# Patient Record
Sex: MALE | URBAN - METROPOLITAN AREA
[De-identification: names, ages, dates, MRNs, and addresses within clinical notes are randomized per-mention and may not be internally consistent; named-entity substitution may affect disease eponyms.]

---

## 2020-07-13 ENCOUNTER — OFFICE VISIT (OUTPATIENT)
Dept: ORTHOPEDIC SURGERY | Facility: CLINIC | Age: 35
End: 2020-07-13

## 2020-07-13 VITALS
DIASTOLIC BLOOD PRESSURE: 69 MMHG | SYSTOLIC BLOOD PRESSURE: 109 MMHG | BODY MASS INDEX: 32.3 KG/M2 | HEART RATE: 77 BPM | HEIGHT: 70 IN | TEMPERATURE: 97.6 F | WEIGHT: 225.6 LBS

## 2020-07-13 DIAGNOSIS — M41.9 SCOLIOSIS, UNSPECIFIED SCOLIOSIS TYPE, UNSPECIFIED SPINAL REGION: ICD-10-CM

## 2020-07-13 DIAGNOSIS — G89.29 CHRONIC RIGHT SHOULDER PAIN: Primary | ICD-10-CM

## 2020-07-13 DIAGNOSIS — M54.9 OTHER CHRONIC BACK PAIN: ICD-10-CM

## 2020-07-13 DIAGNOSIS — G89.29 OTHER CHRONIC BACK PAIN: ICD-10-CM

## 2020-07-13 DIAGNOSIS — M25.511 CHRONIC RIGHT SHOULDER PAIN: Primary | ICD-10-CM

## 2020-07-13 DIAGNOSIS — M79.644 PAIN OF FINGER OF RIGHT HAND: ICD-10-CM

## 2020-07-13 RX ORDER — BETAMETHASONE SODIUM PHOSPHATE AND BETAMETHASONE ACETATE 3; 3 MG/ML; MG/ML
6 INJECTION, SUSPENSION INTRA-ARTICULAR; INTRALESIONAL; INTRAMUSCULAR; SOFT TISSUE ONCE
Qty: 0.5 ML | Refills: 0
Start: 2020-07-13 | End: 2020-07-13

## 2020-07-13 RX ORDER — BETAMETHASONE SODIUM PHOSPHATE AND BETAMETHASONE ACETATE 3; 3 MG/ML; MG/ML
6 INJECTION, SUSPENSION INTRA-ARTICULAR; INTRALESIONAL; INTRAMUSCULAR; SOFT TISSUE ONCE
Qty: 0.5 ML | Refills: 0 | Status: CANCELLED
Start: 2020-07-13 | End: 2020-07-13

## 2020-07-13 RX ORDER — IBUPROFEN 800 MG/1
800 TABLET ORAL
COMMUNITY
End: 2022-01-03 | Stop reason: SDUPTHER

## 2020-07-13 NOTE — PROGRESS NOTES
Verbal order given by Dr. Bryan Cooper to draw up 4 mL 0.25% Sensorcaine and 0.5 mL 30 mg/5mL Betamethasone.

## 2020-07-13 NOTE — PROGRESS NOTES
Patient: Nahum Rodriguez                MRN: [id number]       SSN: [id number]  YOB: 1985        AGE: 30 y.o. SEX: male    PCP: No primary care provider on file.  07/13/20    Chief Complaint   Patient presents with    Shoulder Pain     Rt    Finger Pain    Back Pain     Lumbar & Thoracic     HISTORY:  Nahum Rodriguez is a 30 y.o. male who is seen for back, right shoulder, and right middle finger pain. He has been experiencing back pain for the past several years. He was told he had scoliosis in the past without treatment. He is also seen for right shoulder pain. He has been experiencing right shoulder pain and instability for the past several years. He initially dislocated his shoulder during high school wrestling practice in high school. He states he is able to pop his shoulder back into place himself. He feels shoulder pain with overhead activities and at night. He is also seen for right middle finger pain. He injured his finger when he lost control of a riding  and jammed his finger on the mower while making a sudden move. Pain Assessment  7/13/2020   Location of Pain Shoulder;Finger;Back   Location Modifiers Right   Severity of Pain 9   Quality of Pain Throbbing; Sharp;Dull;Aching;Locking;Grinding;Cracking   Duration of Pain Persistent   Frequency of Pain Constant   Aggravating Factors Bending   Limiting Behavior Yes   Relieving Factors Nothing   Relieving Factors Comment medication not helpful   Result of Injury Yes   Work-Related Injury Yes     Occupation, etc:  Mr. Rivka Serrano works at [employer]. He lives in [place] with his 12 yo son and his son's mother. 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 He had an athletic schlarship to wrestle and play football for [place] but was his life plan was derailed when was arrested and incarcerated. Mr. Rivka Serrano weighs 225 lbs and is 5'10\" tall.        No results found for: HBA1C, HGBE8, RHY2HVNU, NJX0LFNT, DNM4SQPS  Weight Metrics 7/13/2020   Weight 225 lb 9.6 oz   BMI 32.37 kg/m2       There is no problem list on file for this patient.     REVIEW OF SYSTEMS:    Constitutional Symptoms: Negative   Eyes: Negative   Ears, Nose, Throat and Mouth: Negative   Cardiovascular: Negative   Respiratory: Negative   Genitourinary: Per HPI   Gastrointestinal: Per HPI   Integumentary (Skin and/or Breast): Negative   Musculoskeletal: Per HPI   Endocrine/Rheumatologic: Negative   Neurological: Per HPI   Hematology/Lymphatic: Negative    Allergic/Immunologic: Negative   Phychiatric: Negative    Social History     Socioeconomic History    Marital status: SINGLE     Spouse name: Not on file    Number of children: Not on file    Years of education: Not on file    Highest education level: Not on file   Occupational History    Not on file   Social Needs    Financial resource strain: Not on file    Food insecurity     Worry: Not on file     Inability: Not on file    Transportation needs     Medical: Not on file     Non-medical: Not on file   Tobacco Use    Smoking status: Light Tobacco Smoker    Smokeless tobacco: Never Used    Tobacco comment: socially    Substance and Sexual Activity    Alcohol use: Yes     Comment: socially    Drug use: Yes     Types: Marijuana    Sexual activity: Not on file   Lifestyle    Physical activity     Days per week: Not on file     Minutes per session: Not on file    Stress: Not on file   Relationships    Social connections     Talks on phone: Not on file     Gets together: Not on file     Attends [employer]ism service: Not on file     Active member of club or organization: Not on file     Attends meetings of clubs or organizations: Not on file     Relationship status: Not on file    Intimate partner violence     Fear of current or ex partner: Not on file     Emotionally abused: Not on file     Physically abused: Not on file     Forced sexual activity: Not on file   Other Topics Concern    Not on file   Social History Narrative    Not on file      No Known Allergies   Current Outpatient Medications   Medication Sig    ibuprofen (MOTRIN) 800 mg tablet Take  by mouth. No current facility-administered medications for this visit.        PHYSICAL EXAMINATION:  Visit Vitals  /69   Pulse 77   Temp 97.6 °F (36.4 °C)   Ht 5' 10\" (1.778 m)   Wt 225 lb 9.6 oz (102.3 kg)   BMI 32.37 kg/m²      ORTHO EXAMINATION:  Examination Lumbar Thoracic   Skin Intact Intact   Tenderness + -   Tightness - -   Lordosis Normal N/A   Kyphosis N/A Normal   Scoliosis Right thoroco lumbar prominence with forward bending -   Flexion Fingertips to mid shin N/A   Extension 10 N/A   Knee reflexes Normal N/A   Ankle reflexes Normal N/A   Straight leg raise - N/A   Calf tenderness - N/A      Examination Right shoulder Left shoulder   Skin Intact Intact   Effusion - -   Biceps deformity - -   Atrophy - -   AC joint tenderness - -   Acromial tenderness + +   Biceps tenderness - -   Forward flexion/Elevation  170   Active abduction  170   External rotation ROM 40 50   Internal rotation ROM 85 90   Apprehension + -   Impingement - -   Drop Arm Test - -   Neurovascular Intact Intact     Examination Right Hand Left Hand   Skin Intact Intact   Deformity Slight nail deformity middle finger, healing subhematoma -   Swelling - -   Tenderness - -   Finger flexion Full Full   Finger extension Full Full   Sensation Normal Normal   Capillary refill Normal Normal   Heberden's nodes - -   Dupuytren's - -       TIME OUT:  Chart reviewed for the following:   Olga Markham MD, have reviewed the History, Physical and updated the Allergic reactions for Lucho ALLAN Dacia   TIME OUT performed immediately prior to start of procedure:  Olga Markham MD, have performed the following reviews on Lucho D [place] prior to the start of the procedure:          * Patient was identified by name and date of birth * Agreement on procedure being performed was verified  * Risks and Benefits explained to the patient  * Procedure site verified and marked as necessary  * Patient was positioned for comfort  * Consent was obtained     Time: 9:39 AM     Date of procedure: 7/13/2020  Procedure performed by:  Abdi Burgos MD  Mr. Rishi Villafuerte tolerated the procedure well with no complications. RADIOGRAPHS:  XR RIGHT SHOULDER 7/13/20 [facility]  IMPRESSION:  Three views - No fractures, no acromioclavicular narrowing, no glenohumeral narrowing, no calcific densities. XR RIGHT MIDDLE FINGER 7/13/20 [facility]  IMPRESSION:  Three views - healing distal phalanx fracture with mild volar angling fractures, no degenerative changes. XR THOR-LUMB 7/13/20 [facility]  IMPRESSION THOR:  Two views - No fractures, apex left scoliosis, + degenerative changes. IMPRESSION LUMB:  Two views - no fractures, no disc space narrowing, small osteophytes present, no spondylolisthesis. IMPRESSION:      ICD-10-CM ICD-9-CM    1. Chronic right shoulder pain  M25.511 719.41 AMB POC XRAY, SHOULDER; COMPLETE, 2+    G89.29 338.29 MRI SHOULDER RT W CONT      XR INJ SHOULDER RT PRE CT/MRI ARTHRO   2. Pain of finger of right hand  M79.644 729.5 AMB POC XRAY, FINGER(S), 2+ VIEWS   3. Other chronic back pain  M54.9 724.5 POC XRAY, SPINE; THOR-LUMB SP, 2 VIEW    G89.29 338.29 REFERRAL TO SPINE SURGERY      betamethasone (Celestone Soluspan) 6 mg/mL injection      BETAMETHASONE ACETATE & SODIUM PHOSPHATE INJECTION 3 MG EA. INJECT TRIGGER POINT, 1 OR 2   4. Scoliosis, unspecified scoliosis type, unspecified spinal region  M41.9 737.30 REFERRAL TO SPINE SURGERY      betamethasone (Celestone Soluspan) 6 mg/mL injection      BETAMETHASONE ACETATE & SODIUM PHOSPHATE INJECTION 3 MG EA. INJECT TRIGGER POINT, 1 OR 2     PLAN: Right shoulder MR arthrogram ordered.  After discussing treatment options, patient's paralumbar regions were injected with 4 cc Marcaine and 1/2 cc Celestone. Hewill follow up at the spine center & PR[facility] with Dr. Juani Cerna for orthopedic shoulder surgery consultation.       Scribed by Yordy Florez ([address]) as dictated by Esequiel Stephen MD

## 2020-08-07 ENCOUNTER — TELEPHONE (OUTPATIENT)
Dept: ORTHOPEDIC SURGERY | Facility: CLINIC | Age: 35
End: 2020-08-07

## 2020-08-07 DIAGNOSIS — M25.511 CHRONIC RIGHT SHOULDER PAIN: Primary | ICD-10-CM

## 2020-08-07 DIAGNOSIS — G89.29 CHRONIC RIGHT SHOULDER PAIN: Primary | ICD-10-CM

## 2020-08-07 NOTE — TELEPHONE ENCOUNTER
Radhika with [facility] Department called stating that the patient's MRI scheduled for 8/10/2020 was placed into a peer to peer. She stated that the peer to peer is only good until Monday 8/10/2020. The number to call is [phone] option 3 case number [id number].  Please advise Radhika at [phone]

## 2020-08-10 NOTE — TELEPHONE ENCOUNTER
Please note, due to no Physical therapy failed, MRI denied, patient must have documented PT (with failure) to improve in order for possible study authorization, no guarantee though even with PT failure, Patient must be seen back for post PT failure and documented.

## 2020-08-11 NOTE — TELEPHONE ENCOUNTER
Spoke with Trisha Irizarry in regards to where to go from the denied peer to peer. He said to place a order for physical therapy and then return to see Dr. Balta West after the physical therapy has been completed. I placed an order for PT and then I called the patient and explained everything to him. He stated he understood.

## 2020-08-17 ENCOUNTER — HOSPITAL ENCOUNTER (OUTPATIENT)
Dept: PHYSICAL THERAPY | Age: 35
Discharge: HOME | End: 2020-08-17
Payer: MEDICAID

## 2020-08-17 ENCOUNTER — TELEPHONE (OUTPATIENT)
Dept: ORTHOPEDIC SURGERY | Facility: CLINIC | Age: 35
End: 2020-08-17

## 2020-08-17 PROCEDURE — 97162 PT EVAL MOD COMPLEX 30 MIN: CPT

## 2020-08-17 NOTE — PROGRESS NOTES
PT DAILY TREATMENT NOTE    Patient Name: Nahum Rodriguez  Date:2020  : 1985  [x]  Patient  Verified  Payor: [employer] MEDICAID / Plan: 1 [facility] / Product Type: Managed Care Medicaid /    In time: 3:48 PM  Out time: 4:50 PM  Total Treatment Time (min): 62  Total Timed Codes (min): 7  One-on-One Treatment Time (min): 62  Visit #: 1 of 16    Treatment Area: Chronic right shoulder pain [M25.511, G89.29]    SUBJECTIVE  Pain Level (0-10 scale): 0,6/10 Location: R sh,LBP  Any medication changes, allergies to medications, adverse drug reactions, diagnosis change, or new procedure performed?: [x] No [] Yes (see summary sheet for update)  Subjective functional status/changes: See POC    OBJECTIVE    7 min Therapeutic Exercise: [x] See Flow Sheet   Rationale: increase ROM, increase strength, improve coordination, improve balance, and increase proprioception to improve the patients ability to progress to functional activities limited by pain or other dysfunction       Billed As:   [x] TE   [] TA   [] Neuro   [] Self Care min Patient Education: [x] Review HEP, diagnosis, prognosis, POC       Other Objective/Functional Measures:    Pain Level (0-10 scale) post treatment: 0,6/10 Location: R sh,LB    ASSESSMENT:    Justification for Eval Code Complexity:  Patient History: Tobacco use, chronicity of R sh and LBP  Examination: See exam  Clinical Presentation: evolving  Clinical Decision Making: MEDIUM  FOTO: R sh 79 LB 35 /100    Patient will continue to benefit from skilled PT services to modify and progress therapeutic interventions, address functional mobility deficits, address ROM deficits, address strength deficits, analyze and address soft tissue restrictions, analyze and cue movement patterns, analyze and modify body mechanics/ergonomics, assess and modify postural abnormalities, address imbalance/dizziness, and instruct in home and community integration to attain remaining goals.      [x]  See Plan of Care  []  See Progress Note/Re-certification  []  See Discharge Summary      Progress towards goals / Updated goals: See POC    PLAN  [x] Upgrade activities as tolerated   [x] Continue plan of care  [] Update interventions per flow sheet  [] Discharge due to:   [] Other:     Joel Paez, PT 8/17/2020 4:10 PM    Future Appointments   Date Time Provider Neida Claire   8/19/2020  2:45 PM Kermit Lugo, PT [facility]   8/25/2020  3:30 PM Everrett Desanctis, PTA [facility]   8/31/2020 12:45 PM Everrett Desanctis, PTA [facility]   9/2/2020  8:45 AM Everrett Desanctis, PTA [facility]   9/9/2020  9:00 AM Everrett Desanctis, PTA [facility]   9/11/2020  8:15 AM Everrett Desanctis, PTA [facility]   9/14/2020  8:15 AM Amanda Zavala, PT [facility]   9/16/2020  9:00 AM Everrett Desanctis, PTA [facility]   9/21/2020  8:15 AM Amanda Zavala, PT [facility]   9/23/2020  9:00 AM Everrett Desanctis, PTA [facility]   9/28/2020  8:15 AM Amanda Zavala, PT [facility]   9/30/2020  9:00 AM Everrett Desanctis, PTA [facility]

## 2020-08-17 NOTE — PROGRESS NOTES
[address] [place] [address] PHYSICAL THERAPY AT [facility] [address], [address], [address] - Phone: [phone]  Fax: [phone] / [address] [place]  Patient Name: Rebecca Cardenas : 1985   Medical   Diagnosis: Low back pain [M54.5]  Pain in right shoulder [M25.511] Treatment Diagnosis: Low back pain [M54.5]  Pain in right shoulder [M25.511]   Onset Date: LBP (), R Sh ()     Referral Source: Medina Angulo MD [facility]): 2020   Prior Hospitalization: See medical history Provider #: [id number]   Prior Level of Function: WNL without limitations, chronic R sh sublux approx 1x/mo per pt report   Comorbidities: Tobacco use, chronicity of R sh and LBP   Medications: Verified on Patient Summary List   The Plan of Care and following information is based on the information from the initial evaluation.    ==================================================================================  Assessment / key information: Patient is 30 y.o. y o male who presents to [address] [place] [address] PT at [facility] with diagnosis of Low back pain [M54.5]  Pain in right shoulder [M25.511]. 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 \"   Pt has recurrent R shoulder sublux and reduction about 1x/month without MD reduction. Patient scored 28 LB and 68 R Sh on FOTO indicating decreased function and quality of life. Functional limitations include difficulty with R Sh(reaching up, reaching behind); LB (standing >2 hours, walking, sitting, sleeping on stomach or side).   Pt would benefit from skilled PT services to address impairments, work towards goals, and return to PLOF.    R SHOULDER  Pain: current 0/10, at worst 7/10, at best 0/10  Aggravating factors: reaching up, reaching behind  Alleviating factors: popping it back in place  Pain description: surprising  Pain location: anterior R shoulder    R GHJ AROM(PROM): flex 156, ext WNL, , ER WNL, IR WNL - discomfort with functional IR  R GHJ MMT: flex 3+, ext 5, ABD 3+, ER 4-, IR 4-  R Elbow MMT: flex WNL, ext 4-  Special tests: Apprehension test + R, - L  Palpation: TTP R pec    LOW BACK  Pain: current 6/10, at worst 10/10, at best 4/10  Aggravating factors: standing >2 hours, walking, sitting, sleeping on stomach or side  Alleviating factors: ice, Ibuprofen 800  Pain description: like a pole getting moved around  Pain location: mid to low back  Radiation? Numbness/tingling?  None reported    Lumbar AROM/pain: flex bilat malleoli, ext negligible, SB R lat R knee L mid lat thigh pain, rot R WFL L WFL - pain t/o all active movement  Hip MMT: flex R 3 L 3, ext R 3- L 3-, ABD R 3 L 3, IR R 3+ pain L 4+, ER R 3 pain L 4+  Knee MMT: flex R 4- L 4, ext R 5 L 5  Ankle MMT: grossly WNL bilat  Palpation: TTP R lumbar paraspinals, bilat PSIS, superior R post hip musculature  ==================================================================================  Eval Complexity: History: MEDIUM  Complexity : 1-2 comorbidities / personal factors will impact the outcome/ POC Exam:MEDIUM Complexity : 3 Standardized tests and measures addressing body structure, function, activity limitation and / or participation in recreation  Presentation: MEDIUM Complexity : Evolving with changing characteristics  Clinical Decision Making:MEDIUM Complexity : FOTO score of 26-74Overall Complexity:MEDIUM  Problem List: pain affecting function, decrease ROM, decrease strength, edema affecting function, impaired gait/ balance, decrease ADL/ functional abilitiies, decrease activity tolerance, decrease flexibility/ joint mobility and decrease transfer abilities   Treatment Plan may include any combination of the following: Therapeutic exercise, Therapeutic activities, Neuromuscular re-education, Physical agent/modality, Gait/balance training, Manual therapy, Patient education, Self Care training, Functional mobility training and Stair training  Patient / Family readiness to learn indicated by: asking questions, trying to perform skills and interest  Persons(s) to be included in education: patient (P)  Barriers to Learning/Limitations: yes;  other Transportation  Measures taken:    Patient Goal (s): \"To get rid of the pain\"   Patient self reported health status: good  Rehabilitation Potential: good   Short Term Goals: To be accomplished in  4  weeks:  1) Pt performing HEP. 2) Pt will report 50% improvement in ability to perform UE functional tasks, ADL, aggravating movements. 3) Pt to demo improved R GHJ ROM all planes to within normal limits to facilitate UE functional tasks, ADL. 4) Patient to demo lumbar AROM all planes WNL to facilitate functional tasks, ADL, work duties.  Long Term Goals: To be accomplished in  8  weeks:  1) Patient Independent with progressive HEP to facilitate symptom management and progression upon DC. 2) Pt to increase score on FOTO to R Sh 78, LB 53  indicating improved function and quality of life. 3) Pt to demo R GHJ MMT >=4+ in order to have improved functional mobility. 4) Patient to demo BLE, low abdom MMT >=4+ to facilitate functional tasks, ADL, work duties. Frequency / Duration:   Patient to be seen  2-3  times per week for 8  weeks:  Patient / Caregiver education and instruction: exercises and other PT diagnosis, prognosis, POC    Therapist Signature: Olga Cunningham, PT Date: 8/08/4756   Certification Period: NA Time: 4:08 PM   ==================================================================================  I certify that the above Physical Therapy Services are being furnished while the patient is under my care. I agree with the treatment plan and certify that this therapy is necessary.     To ensure your patient receives the highest quality care and to avoid disruption in therapy please sign and return this plan of care within 21 days. Per Medicaid guidelines if the plan of care is not received within 21 days the patient's care must be put on hold until signed. Physician Signature:        Date:       Time:     Please sign and return to [facility] or you may fax the signed copy to [phone]. Thank you.

## 2020-08-17 NOTE — TELEPHONE ENCOUNTER
Pedro Man from [facility] called asking if the patient's referral for physical therapy can include his back as well per patient's request. Please advise Pedro Man at [phone]

## 2020-08-17 NOTE — TELEPHONE ENCOUNTER
OK per Dr Benitez Petties to add lumbar to current therapy treatment--spoke with [facility]--faxed to [phone]

## 2020-08-19 ENCOUNTER — HOSPITAL ENCOUNTER (OUTPATIENT)
Dept: PHYSICAL THERAPY | Age: 35
Discharge: HOME | End: 2020-08-19
Payer: MEDICAID

## 2020-08-19 PROCEDURE — 97110 THERAPEUTIC EXERCISES: CPT

## 2020-08-19 PROCEDURE — 97112 NEUROMUSCULAR REEDUCATION: CPT

## 2020-08-19 PROCEDURE — 97014 ELECTRIC STIMULATION THERAPY: CPT

## 2020-08-19 PROCEDURE — 97530 THERAPEUTIC ACTIVITIES: CPT

## 2020-08-19 NOTE — PROGRESS NOTES
PT DAILY TREATMENT NOTE     Patient Name: Sienna Donohue  Date:2020  : 1985  [x]  Patient  Verified  Payor: Nito Alexandre / Plan: [address] [place] [id number] / Product Type: Managed Care Medicaid /    In time:3:08  Out time:4:10  Total Treatment Time (min): 62  Visit #: 2 of 16    Treatment Area: Low back pain [M54.5]  Pain in right shoulder [M25.511]    SUBJECTIVE  Pain Level (0-10 scale): 7 low back, 0 shoulder  Any medication changes, allergies to medications, adverse drug reactions, diagnosis change, or new procedure performed?: [x] No    [] Yes (see summary sheet for update)  Subjective functional status/changes:   [] No changes reported  Patient reports pain from the midback to the lower back which is constant.     OBJECTIVE  Modality rationale: decrease pain and increase tissue extensibility to improve the patients ability to sleep   Min Type Additional Details   10 [] Estim: []Att   [x]Unatt  []TENS instruct                 [x]IFC  []Premod []NMES                       []Other:  []w/US   [x]w/ice   []w/heat  Position: supine  Location: thoracolumbar    []  Traction: [] Cervical       []Lumbar                       [] Prone          []Supine                       []Intermittent   []Continuous Lbs:  [] before manual  [] after manual    []  Ultrasound: []Continuous   [] Pulsed                           []1MHz   []3MHz Location:  W/cm2:    []  Iontophoresis with dexamethasone         Location: [] Take home patch   [] In clinic    []  Ice     []  heat  []  Ice massage Position:  Location:    []  Vasopneumatic Device Pressure: [] lo [] med [] hi   Temp: [] lo [] med [] hi   [] Skin assessment post-treatment:  []intact []redness- no adverse reaction       []redness  adverse reaction:       10 min Therapeutic Exercise:  Stretches   Rationale: increase ROM, increase strength, improve coordination, improve balance, and increase proprioception to improve the patients ability to progress to functional activities limited by pain or other dysfunction     15 min Therapeutic Activity:  UBE, pronelying with education on positioning to abolish or decrease pain. Rationale: to improve functional activities, model real life movements and performance specific to the patients need with supervision to return the patient to their PLOF      22 min Neuromuscular Re-education:  Shoulder isometrics, TA, BKFO   Rationale: exercises designed to improve and/or maintain balance, coordination, kinesthetic sense, posture, and/or proprioception for functional activities to improve the patients ability to function in daily life    na min Gait Training:    Rationale: facilitate gait in all varieties including dynamic movements to improved gait quality in the home and community    5 min Manual Therapy:  NC per insurance restrictions No tenderness to palpation over thoraco lumbar paraspinals or spinous processes. Rationale: increase postural awareness to na          X throughout treatment min Patient Education: [x] Review HEP. Shoulder isometrics, TA, BKFO. Pronelying at least 1x day. Effects of IFC. He thinks he may have a TENS unit. Encouraged to bring the unit in. Other Objective/Functional Measures: Pain decreased to 2/10 with pronelying. SKTC and prone to elbows increased pain. Pain Level (0-10 scale) post treatment: LB 5, shoulder 0  SSESSMENT/Changes in Function: Patient continues to report constant pain. Appears to be neutral to extension biased. Began core strengthening for shoulder and back.     Patient will continue to benefit from skilled PT services to modify and progress therapeutic interventions, address functional mobility deficits, address ROM deficits, address strength deficits, analyze and address soft tissue restrictions, analyze and cue movement patterns, analyze and modify body mechanics/ergonomics, assess and modify postural abnormalities, address imbalance/dizziness, and instruct in home and community integration to attain remaining goals. []  See Plan of Care  []  See progress note/recertification  []  See Discharge Summary         Progress towards goals / Updated goals:  1) Pt performing HEP. 2) Pt will report 50% improvement in ability to perform UE functional tasks, ADL, aggravating movements. 3) Pt to demo improved R GHJ ROM all planes to within normal limits to facilitate UE functional tasks, ADL. 4) Patient to demo lumbar AROM all planes WNL to facilitate functional tasks, ADL, work duties.     PLAN  [x]  Upgrade activities as tolerated     []  Continue plan of care  []  Update interventions per flow sheet       []  Discharge due to:_  []  Other:_      Hetal Badillo PT 8/19/2020  4:10 PM    Future Appointments   Date Time Provider Neida Claire   8/25/2020  3:30 PM Leafy Dionna, [facility]   8/31/2020 12:45 PM Leafy Dionna, PTA [facility]   9/2/2020  8:45 AM Leafy Dionna, PTA [facility]   9/9/2020  9:00 AM Leafy Dionna, PTA [facility]   9/11/2020  8:15 AM Leafy Dionna, PTA [facility]   9/14/2020  8:15 AM Michelle Cameron, PT [facility]   9/16/2020  9:00 AM Leafy Dionna, PTA [facility]   9/21/2020  8:15 AM Michelle Cameron, PT [facility]   9/23/2020  9:00 AM Leafy Dionna, PTA [facility]   9/28/2020  8:15 AM Michelle Cameron, PT [facility]   9/30/2020  9:00 AM [facility] Dion[facility]

## 2020-08-25 ENCOUNTER — HOSPITAL ENCOUNTER (OUTPATIENT)
Dept: PHYSICAL THERAPY | Age: 35
Discharge: HOME | End: 2020-08-25
Payer: MEDICAID

## 2020-08-25 PROCEDURE — 97014 ELECTRIC STIMULATION THERAPY: CPT

## 2020-08-25 PROCEDURE — 97110 THERAPEUTIC EXERCISES: CPT

## 2020-08-25 PROCEDURE — 97530 THERAPEUTIC ACTIVITIES: CPT

## 2020-08-25 PROCEDURE — 97112 NEUROMUSCULAR REEDUCATION: CPT

## 2020-08-25 NOTE — PROGRESS NOTES
PT DAILY TREATMENT NOTE     Patient Name: Ranee Gowers  Date:2020  : 1985  [x]  Patient  Verified  Payor: Alissa Onofre / Plan: [address] [place] [id number] / Product Type: Managed Care Medicaid /    In time:3:45 pm Out time: 4:42 pm  Total Treatment Time (min): 62  Visit #: 3 of 16    Treatment Area: Low back pain [M54.5]  Pain in right shoulder [M25.511]    SUBJECTIVE  Pain Level (0-10 scale): 7 low back, 0 shoulder  Any medication changes, allergies to medications, adverse drug reactions, diagnosis change, or new procedure performed?: [x] No    [] Yes (see summary sheet for update)  Subjective functional status/changes:   [] No changes reported  Patient reports pain in low back is constant. Doing HEP . TA draw making back hurt.      OBJECTIVE  Modality rationale: decrease pain and increase tissue extensibility to improve the patients ability to sleep   Min Type Additional Details   10 [] Estim: []Att   [x]Unatt  []TENS instruct                 [x]IFC  []Premod []NMES                       []Other:  []w/US   [x]w/ice   []w/heat  Position: supine  Location: thoracolumbar    []  Traction: [] Cervical       []Lumbar                       [] Prone          []Supine                       []Intermittent   []Continuous Lbs:  [] before manual  [] after manual    []  Ultrasound: []Continuous   [] Pulsed                           []1MHz   []3MHz Location:  W/cm2:    []  Iontophoresis with dexamethasone         Location: [] Take home patch   [] In clinic    []  Ice     []  heat  []  Ice massage Position:  Location:    []  Vasopneumatic Device Pressure: [] lo [] med [] hi   Temp: [] lo [] med [] hi   [x] Skin assessment post-treatment:  [x]intact []redness- no adverse reaction       []redness  adverse reaction:       10 min Therapeutic Exercise:  Stretches   Rationale: increase ROM, increase strength, improve coordination, improve balance, and increase proprioception to improve the patients ability to progress to functional activities limited by pain or other dysfunction     18 min Therapeutic Activity:  UBE, pronelying with education on positioning to abolish or decrease pain. Rationale: to improve functional activities, model real life movements and performance specific to the patients need with supervision to return the patient to their PLOF      22 min Neuromuscular Re-education:  Shoulder isometrics, TA, BKFO   Rationale: exercises designed to improve and/or maintain balance, coordination, kinesthetic sense, posture, and/or proprioception for functional activities to improve the patients ability to function in daily life    na min Gait Training:    Rationale: facilitate gait in all varieties including dynamic movements to improved gait quality in the home and community    7 min Manual Therapy:  NC per insurance restrictions No tenderness to palpation over thoraco lumbar paraspinals or spinous processes. Rationale: increase postural awareness to na          X throughout treatment min Patient Education: [x] Review HEP. Shoulder isometrics, TA, BKFO. Pronelying at least 1x day. Effects of IFC. He thinks he may have a TENS unit. Encouraged to bring the unit in. Other Objective/Functional Measures: Pain decreased to 2/10 with pronelying. SKTC and prone to elbows increased pain. Pain Level (0-10 scale) post treatment: LB 5, shoulder 0  SSESSMENT/Changes in Function: Patient continues to report constant pain. Appears to be neutral to extension biased. Began core strengthening for shoulder and back.     Patient will continue to benefit from skilled PT services to modify and progress therapeutic interventions, address functional mobility deficits, address ROM deficits, address strength deficits, analyze and address soft tissue restrictions, analyze and cue movement patterns, analyze and modify body mechanics/ergonomics, assess and modify postural abnormalities, address imbalance/dizziness, and instruct in home and community integration to attain remaining goals. []  See Plan of Care  []  See progress note/recertification  []  See Discharge Summary         Progress towards goals / Updated goals:  1) Pt performing HEP. 2) Pt will report 50% improvement in ability to perform UE functional tasks, ADL, aggravating movements. 3) Pt to demo improved R GHJ ROM all planes to within normal limits to facilitate UE functional tasks, ADL. 4) Patient to demo lumbar AROM all planes WNL to facilitate functional tasks, ADL, work duties.     PLAN  [x]  Upgrade activities as tolerated     []  Continue plan of care  []  Update interventions per flow sheet       []  Discharge due to:_  []  Other:_      Erna Frankel PTA 8/25/2020  4:42 PM    Future Appointments   Date Time Provider Neida Claire   8/31/2020 12:45 PM Ronal Harris PTA [facility]   9/2/2020  8:45 AM Ronal Harris PTA [facility]   9/9/2020  9:00 AM Ronal Harris PTA [facility]   9/11/2020  8:15 AM Ronal Harris PTA [facility]   9/14/2020  8:15 AM Pernell Evangelista, PT [facility]   9/16/2020  9:00 AM Ronal Harris PTA [facility]   9/21/2020  8:15 AM Pernell Evangelista, PT [facility]   9/23/2020  9:00 AM Ronal Harris PTA [facility]   9/28/2020  8:15 AM Pernell Evangelista, PT [facility]   9/30/2020  9:00 AM Ronal Harris, [facility]

## 2020-09-02 ENCOUNTER — HOSPITAL ENCOUNTER (OUTPATIENT)
Dept: PHYSICAL THERAPY | Age: 35
Discharge: HOME | End: 2020-09-02
Payer: MEDICAID

## 2020-09-02 PROCEDURE — 97014 ELECTRIC STIMULATION THERAPY: CPT

## 2020-09-02 PROCEDURE — 97535 SELF CARE MNGMENT TRAINING: CPT

## 2020-09-02 PROCEDURE — 97530 THERAPEUTIC ACTIVITIES: CPT

## 2020-09-02 NOTE — PROGRESS NOTES
PT DAILY TREATMENT NOTE     Patient Name: Laura Rowland  Date:2020  : 1985  [x]  Patient  Verified  Payor: Veronica Nelli / Plan: [address] [place] [id number] / Product Type: Managed Care Medicaid /    In time:8:58 am Out time: 9:45   Total Treatment Time (min): 47  Visit #: 4 of 16    Treatment Area: Low back pain [M54.5]  Pain in right shoulder [M25.511]    SUBJECTIVE  Pain Level (0-10 scale): 6/10  low back, 0 shoulder  Any medication changes, allergies to medications, adverse drug reactions, diagnosis change, or new procedure performed?: [x] No    [] Yes (see summary sheet for update)  Subjective functional status/changes:   [] No changes reported  Patient reports pain in mid back over the weekend. He was laying left side on the bed playing cars with son and felt clicking in back. Used ice. He woke  5 pm he had to be straight. Difficulty leaning back  Into extension. Monday he woke up and  felt  pain with any direction with movement. He reports pain is slightly better today.       OBJECTIVE  Modality rationale: decrease pain and increase tissue extensibility to improve the patients ability to sleep   Min Type Additional Details   10 [] Estim: []Att   [x]Unatt  []TENS instruct                 [x]IFC  []Premod []NMES                       []Other:  []w/US   [x]w/ice   []w/heat  Position: supine  Location: thoracolumbar junction    []  Traction: [] Cervical       []Lumbar                       [] Prone          []Supine                       []Intermittent   []Continuous Lbs:  [] before manual  [] after manual    []  Ultrasound: []Continuous   [] Pulsed                           []1MHz   []3MHz Location:  W/cm2:    []  Iontophoresis with dexamethasone         Location: [] Take home patch   [] In clinic    []  Ice     []  heat  []  Ice massage Position:  Location:    []  Vasopneumatic Device Pressure: [] lo [] med [] hi   Temp: [] lo [] med [] hi   [x] Skin assessment post-treatment:  [x]intact []redness- no adverse reaction       []redness  adverse reaction:           11 min Therapeutic Activity:  UBE, sleeping positioning with towel and pillow   Rationale: to improve functional activities, model real life movements and performance specific to the patients need with supervision to return the patient to their PLOF      7 min Neuromuscular Re-education:  Shoulder isometrics, TA, BKFO   Rationale: exercises designed to improve and/or maintain balance, coordination, kinesthetic sense, posture, and/or proprioception for functional activities to improve the patients ability to function in daily life    na min Gait Training:    Rationale: facilitate gait in all varieties including dynamic movements to improved gait quality in the home and community    7 min Manual Therapy:  NC per insurance restrictions  Processes. Prone  STM to left > right Q/L, lower   Rationale: increase postural awareness to na          12 min Patient Education: [x] Review HEP. Postioning, activity modifcation for pain management         Other Objective/Functional Measures: Review body mechanics for sleeping and sitting, reaching body mechanics. Trunk AROM FF: ~50%, ext: 20%    Pain Level (0-10 scale) post treatment:    ASSESSMENT/Changes in Function:   Patient presenting with increase mm tone and muscle tenderness in left Q-L and glut medius, upper lumbar paraspinals > Right. Exercises held per flow sheet secondary to muscle spasm/pain in left low and mid back. Review positioning , use of ice/MH and HEP as tolerated for pain management. Recommended pt to consult MD if sx persist or increase. Pt reporting slight pain relief. Noted slight decreased ms tone after session.      Patient will continue to benefit from skilled PT services to modify and progress therapeutic interventions, address functional mobility deficits, address ROM deficits, address strength deficits, analyze and address soft tissue restrictions, analyze and cue movement patterns, analyze and modify body mechanics/ergonomics, assess and modify postural abnormalities, address imbalance/dizziness, and instruct in home and community integration to attain remaining goals. [x]  See Plan of Care  []  See progress note/recertification  []  See Discharge Summary         Progress towards goals / Updated goals:  1) Pt performing HEP. - goal in progress  2) Pt will report 50% improvement in ability to perform UE functional tasks, ADL, aggravating movements. 3) Pt to demo improved R GHJ ROM all planes to within normal limits to facilitate UE functional tasks, ADL. 4) Patient to demo lumbar AROM all planes WNL to facilitate functional tasks, ADL, work duties.     PLAN  [x]  Upgrade activities as tolerated     []  Continue plan of care  []  Update interventions per flow sheet       []  Discharge due to:_  []  Other:_      Martha Terrell PTA 9/2/2020  9:45 am    Future Appointments   Date Time Provider Neida Claire   9/9/2020  9:00 AM Martin Adas, PTA [facility]   9/11/2020  8:15 AM Martin Adas, PTA [facility]   9/14/2020  8:15 AM Aviles Boards, PT [facility]   9/16/2020  9:00 AM Martin Adas, PTA [facility]   9/21/2020  8:15 AM Aviles Boards, PT [facility]   9/23/2020  9:00 AM Martin Adas, PTA [facility]   9/28/2020  8:15 AM Aviles Boards, PT [facility]   9/30/2020  9:00 AM Martin Adas, PTA [facility]

## 2020-09-09 ENCOUNTER — HOSPITAL ENCOUNTER (OUTPATIENT)
Dept: PHYSICAL THERAPY | Age: 35
Discharge: HOME | End: 2020-09-09
Payer: MEDICAID

## 2020-09-09 PROCEDURE — 97112 NEUROMUSCULAR REEDUCATION: CPT

## 2020-09-09 PROCEDURE — 97530 THERAPEUTIC ACTIVITIES: CPT

## 2020-09-09 PROCEDURE — 97014 ELECTRIC STIMULATION THERAPY: CPT

## 2020-09-09 NOTE — PROGRESS NOTES
PT DAILY TREATMENT NOTE     Patient Name: Emily Sullivan  Date:2020  : 1985  [x]  Patient  Verified  Payor: Kathy Abo / Plan: 1 [facility] / Product Type: Managed Care Medicaid /    In time:9:05 am Out time: 9:54  Total Treatment Time (min): 49  Visit #:5 of 16    Treatment Area: Low back pain [M54.5]  Pain in right shoulder [M25.511]    SUBJECTIVE  Pain Level (0-10 scale): 6/10  low back, 0 shoulder  Any medication changes, allergies to medications, adverse drug reactions, diagnosis change, or new procedure performed?: [x] No    [] Yes (see summary sheet for update)  Subjective functional status/changes:   [] No changes reported  Patient reports no change in his back pain. \"I think the pain is coming from my scoliosis. It looked big when my doctor showed me the xray. \"  Shoulder has been doing OK.      OBJECTIVE  Modality rationale: decrease pain and increase tissue extensibility to improve the patients ability to sleep   Min Type Additional Details   10 [] Estim: []Att   [x]Unatt  []TENS instruct                 [x]IFC  []Premod []NMES                       []Other:  []w/US   [x]w/ice   []w/heat  Position: supine  Location: thoracolumbar junction    []  Traction: [] Cervical       []Lumbar                       [] Prone          []Supine                       []Intermittent   []Continuous Lbs:  [] before manual  [] after manual    []  Ultrasound: []Continuous   [] Pulsed                           []1MHz   []3MHz Location:  W/cm2:    []  Iontophoresis with dexamethasone         Location: [] Take home patch   [] In clinic    []  Ice     []  heat  []  Ice massage Position:  Location:    []  Vasopneumatic Device Pressure: [] lo [] med [] hi   Temp: [] lo [] med [] hi   [x] Skin assessment post-treatment:  [x]intact []redness- no adverse reaction       []redness  adverse reaction:           15 min Therapeutic Activity:  Tennis ball massage to left lumbar paraspinals. Rationale: to improve functional activities, model real life movements and performance specific to the patients need with supervision to return the patient to their PLOF      19 min Neuromuscular Re-education:  TA, Head press, shoulder press, leg press   Rationale: exercises designed to improve and/or maintain balance, coordination, kinesthetic sense, posture, and/or proprioception for functional activities to improve the patients ability to function in daily life    na min Gait Training:    Rationale: facilitate gait in all varieties including dynamic movements to improved gait quality in the home and community    5 min Manual Therapy:  NC per insurance restrictions  Processes. Prone  DTM left thoracolumbar paraspinals   Rationale: increase postural awareness to na           min Patient Education: [x] Review HEP. Add head, sh, leg press     Other Objective/Functional Measures: Added head, shoulder and leg press for strengthening of paraspinals. Pain Level (0-10 scale) post treatment:    ASSESSMENT/Changes in Function: Palpable muscle spasm left L/S paraspinals. Patient will continue to benefit from skilled PT services to modify and progress therapeutic interventions, address functional mobility deficits, address ROM deficits, address strength deficits, analyze and address soft tissue restrictions, analyze and cue movement patterns, analyze and modify body mechanics/ergonomics, assess and modify postural abnormalities, address imbalance/dizziness, and instruct in home and community integration to attain remaining goals. []  See Plan of Care  []  See progress note/recertification  []  See Discharge Summary         Progress towards goals / Updated goals:  1) Pt performing HEP. - goal in progress  2) Pt will report 50% improvement in ability to perform UE functional tasks, ADL, aggravating movements.   3) Pt to demo improved R GHJ ROM all planes to within normal limits to facilitate UE functional tasks, ADL. 4) Patient to demo lumbar AROM all planes WNL to facilitate functional tasks, ADL, work duties.     PLAN  [x]  Upgrade activities as tolerated     []  Continue plan of care  []  Update interventions per flow sheet       []  Discharge due to:_  []  Other:_      Cordelia Yang, PT 9/9/2020  9:54 am    Future Appointments   Date Time Provider Neida Claire   9/11/2020  8:15 AM Иван Joseph PTA [facility]   9/14/2020  8:15 AM Wing Donnelly, PT [facility]   9/16/2020  9:00 AM Иван Joseph PTA [facility]   9/21/2020  8:15 AM Wing Donnelly, PT [facility]   9/23/2020  9:00 AM Иван Joseph PTA [facility]   9/28/2020  8:15 AM Wing Donnelly, PT [facility]   9/30/2020  9:00 AM Иван Joseph PTA [facility]

## 2020-09-11 ENCOUNTER — APPOINTMENT (OUTPATIENT)
Dept: PHYSICAL THERAPY | Age: 35
End: 2020-09-11
Payer: MEDICAID

## 2020-09-14 ENCOUNTER — APPOINTMENT (OUTPATIENT)
Dept: PHYSICAL THERAPY | Age: 35
End: 2020-09-14
Payer: MEDICAID

## 2020-09-15 ENCOUNTER — HOSPITAL ENCOUNTER (OUTPATIENT)
Dept: PHYSICAL THERAPY | Age: 35
Discharge: HOME | End: 2020-09-15
Payer: MEDICAID

## 2020-09-15 PROCEDURE — 97110 THERAPEUTIC EXERCISES: CPT

## 2020-09-15 PROCEDURE — 97112 NEUROMUSCULAR REEDUCATION: CPT

## 2020-09-15 PROCEDURE — 97014 ELECTRIC STIMULATION THERAPY: CPT

## 2020-09-15 NOTE — PROGRESS NOTES
PT DAILY TREATMENT NOTE     Patient Name: Nguyen Ballard  Date:9/15/2020  : 1985  [x]  Patient  Verified  Payor: Gunnar Chapman / Plan: [address] [place] [address] / Product Type: Managed Care Medicaid /    In time: 2:26 PM  Out time: 3:04 PM  Total Treatment Time (min): 38  Visit #: 6 of 16    Treatment Area: Low back pain [M54.5]  Pain in right shoulder [M25.511]    SUBJECTIVE  Pain Level (0-10 scale): 8/10  low back, 0/10 shoulder  Any medication changes, allergies to medications, adverse drug reactions, diagnosis change, or new procedure performed?: [x] No    [] Yes (see summary sheet for update)  Subjective functional status/changes:   [] No changes reported  Laying on my left side feels better. I thought I would have more time to sit after I got off work.      OBJECTIVE  Modality rationale: decrease pain and increase tissue extensibility to improve the patients ability to sleep   Min Type Additional Details   10 [] Estim: []Att   [x]Unatt  []TENS instruct                 [x]IFC  []Premod []NMES                       []Other:  []w/US   [x]w/ice   []w/heat  Position: supine  Location: thoracolumbar junction    []  Traction: [] Cervical       []Lumbar                       [] Prone          []Supine                       []Intermittent   []Continuous Lbs:  [] before manual  [] after manual    []  Ultrasound: []Continuous   [] Pulsed                           []1MHz   []3MHz Location:  W/cm2:    []  Iontophoresis with dexamethasone         Location: [] Take home patch   [] In clinic    []  Ice     []  heat  []  Ice massage Position:  Location:    []  Vasopneumatic Device Pressure: [] lo [] med [] hi   Temp: [] lo [] med [] hi   [x] Skin assessment post-treatment:  [x]intact []redness- no adverse reaction       []redness  adverse reaction:       12 min Therapeutic Exercise:  [x]  See flow sheet :   Rationale: increase ROM and increase strength to improve the patients ability to perform functional ADL's.    6 (NC) min Therapeutic Activity:  UBE   Rationale: to improve functional activities, model real life movements and performance specific to the patients need with supervision to return the patient to their PLOF      10 min Neuromuscular Re-education:  TAAILYN   Rationale: exercises designed to improve and/or maintain balance, coordination, kinesthetic sense, posture, and/or proprioception for functional activities to improve the patients ability to function in daily life    N/A min Gait Training:    Rationale: facilitate gait in all varieties including dynamic movements to improved gait quality in the home and community    - min Manual Therapy:  NC per insurance restrictions  Processes. Prone  DTM left thoracolumbar paraspinals   Rationale: increase postural awareness to na           min Patient Education: [x] Review HEP. Other Objective/Functional Measures:   - pt 10 minutes late for appt  - p! with prone lying  - p! with UBE     Pain Level (0-10 scale) post treatment: 6/10    ASSESSMENT/Changes in Function:   Patient presents with chief c/o of flare up of left sided L/S pain rated 8/10. Patient unable to perform prone lying due to increase in pain. Max difficulty with UBE due to rotational movement irritating his left L/S, able to tolerate if he stabilizes his lower back against the seat back. Mild relief from IFC with CP. Patient will continue to benefit from skilled PT services to modify and progress therapeutic interventions, address functional mobility deficits, address ROM deficits, address strength deficits, analyze and address soft tissue restrictions, analyze and cue movement patterns, analyze and modify body mechanics/ergonomics, assess and modify postural abnormalities, address imbalance/dizziness, and instruct in home and community integration to attain remaining goals.      []  See Plan of Care  []  See progress note/recertification  []  See Discharge Summary         Progress towards goals / Updated goals:  1) Pt performing HEP. - goal in progress  2) Pt will report 50% improvement in ability to perform UE functional tasks, ADL, aggravating movements. 3) Pt to demo improved R GHJ ROM all planes to within normal limits to facilitate UE functional tasks, ADL. 4) Patient to demo lumbar AROM all planes WNL to facilitate functional tasks, ADL, work duties.     PLAN  [x]  Upgrade activities as tolerated     []  Continue plan of care  []  Update interventions per flow sheet       []  Discharge due to:_  []  Other:_      TRENT Mojica 9/15/2020  3:04 PM    Future Appointments   Date Time Provider Neida Claire   9/15/2020  2:15 PM [facility] LEN[facility] 1 [facility]   9/17/2020  3:30 PM Alysha Organ, PTA [facility]   9/22/2020  3:00 PM [facility] PT LEN[facility] 1 [facility]   9/24/2020  3:30 PM Lissette Newton, PT [facility]   10/5/2020  2:45 PM Ilia Pollock, PT [facility]   10/7/2020  3:00 PM Alysha Organ, PTA [facility]   10/12/2020  3:00 PM Alysha Organ, PTA [facility]

## 2020-09-16 ENCOUNTER — APPOINTMENT (OUTPATIENT)
Dept: PHYSICAL THERAPY | Age: 35
End: 2020-09-16
Payer: MEDICAID

## 2020-09-21 ENCOUNTER — APPOINTMENT (OUTPATIENT)
Dept: PHYSICAL THERAPY | Age: 35
End: 2020-09-21
Payer: MEDICAID

## 2020-09-23 ENCOUNTER — APPOINTMENT (OUTPATIENT)
Dept: PHYSICAL THERAPY | Age: 35
End: 2020-09-23
Payer: MEDICAID

## 2020-09-25 ENCOUNTER — APPOINTMENT (OUTPATIENT)
Dept: PHYSICAL THERAPY | Age: 35
End: 2020-09-25
Payer: MEDICAID

## 2020-09-28 ENCOUNTER — APPOINTMENT (OUTPATIENT)
Dept: PHYSICAL THERAPY | Age: 35
End: 2020-09-28
Payer: MEDICAID

## 2020-09-30 ENCOUNTER — APPOINTMENT (OUTPATIENT)
Dept: PHYSICAL THERAPY | Age: 35
End: 2020-09-30
Payer: MEDICAID

## 2020-10-05 ENCOUNTER — APPOINTMENT (OUTPATIENT)
Dept: PHYSICAL THERAPY | Age: 35
End: 2020-10-05

## 2020-10-07 ENCOUNTER — APPOINTMENT (OUTPATIENT)
Dept: PHYSICAL THERAPY | Age: 35
End: 2020-10-07

## 2020-10-12 ENCOUNTER — APPOINTMENT (OUTPATIENT)
Dept: PHYSICAL THERAPY | Age: 35
End: 2020-10-12

## 2020-10-15 NOTE — PROGRESS NOTES
[address] PHYSICAL THERAPY AT [address], [address], [address] - Phone: [phone]  Fax: [phone] SUMMARY  Patient Name: Nehemiah Davila : 1985   Treatment/Medical Diagnosis: Low back pain [M54.5]  Pain in right shoulder [M25.511]   Referral Source: Candy Alejandre MD     Date of Initial Visit: 20 Attended Visits: 6 Missed Visits: 4     SUMMARY OF TREATMENT  Physical therapy treatment has consisted of Therapeutic exercise, Therapeutic Activity, Neuromuscular reeducation, Manual therapy, pt education in HEP, and Interferential electrical stimulation , and ice. CURRENT STATUS  Pt was unable to be formally reassessed secondary to not returning to PT after 9-15-20. Goal/Measure of Progress Goal Met? 1. Pt performing HEP. Status at last Eval: New goal Current Status: Pt instructed in initial HEP.  partially met   2. Pt will report 50% improvement in ability to perform UE functional tasks, ADL, aggravating movements   Status at last Eval: New goal Current Status: Unable to formally reassess. no   3. Pt to demo improved R GHJ ROM all planes to within normal limits to facilitate UE functional tasks, ADL   Status at last Eval: R GHJ AROM(PROM): flex 156, ext WNL, , ER WNL, IR WNL - discomfort with functional IR Current Status: Unable to formally reassess. no   4. Patient to demo lumbar AROM all planes WNL to facilitate functional tasks, ADL, work duties   Status at last Eval: Lumbar AROM/pain: flex bilat malleoli, ext negligible, SB R lat R knee L mid lat thigh pain, rot R WFL L WFL - pain t/o all active movement Current Status: Unable to formally reassess. no       RECOMMENDATIONS  Discontinue therapy due to lack of attendance or compliance. If you have any questions/comments please contact us directly at [phone]. Thank you for allowing us to assist in the care of your patient.     LPTA Signature: Carmenza Kaufman, SINGH Date: 9-15-20   Therapist Signature: Michelle Lowe. Wade Das DPT Time: 6:58 AM     To ensure we are able to process the patients encounter and avoid risk of your patient receiving a bill for our services, please sign and return this discharge summary.     Physician Signature:__________________________   Date: _________  Time:__________

## 2021-06-10 ENCOUNTER — OFFICE VISIT (OUTPATIENT)
Dept: ORTHOPEDIC SURGERY | Age: 36
End: 2021-06-10
Payer: MEDICAID

## 2021-06-10 VITALS
HEIGHT: 70 IN | WEIGHT: 222 LBS | BODY MASS INDEX: 31.78 KG/M2 | OXYGEN SATURATION: 97 % | HEART RATE: 65 BPM | TEMPERATURE: 97.6 F

## 2021-06-10 DIAGNOSIS — M54.6 CHRONIC THORACIC BACK PAIN, UNSPECIFIED BACK PAIN LATERALITY: ICD-10-CM

## 2021-06-10 DIAGNOSIS — G89.29 CHRONIC THORACIC BACK PAIN, UNSPECIFIED BACK PAIN LATERALITY: ICD-10-CM

## 2021-06-10 DIAGNOSIS — M43.17 SPONDYLOLISTHESIS AT L5-S1 LEVEL: Primary | ICD-10-CM

## 2021-06-10 DIAGNOSIS — M43.17 SPONDYLOLISTHESIS AT L5-S1 LEVEL: ICD-10-CM

## 2021-06-10 PROCEDURE — 99204 OFFICE O/P NEW MOD 45 MIN: CPT | Performed by: PHYSICAL MEDICINE & REHABILITATION

## 2021-06-10 PROCEDURE — 72110 X-RAY EXAM L-2 SPINE 4/>VWS: CPT | Performed by: PHYSICAL MEDICINE & REHABILITATION

## 2021-06-10 RX ORDER — HYDROCODONE BITARTRATE AND ACETAMINOPHEN 5; 325 MG/1; MG/1
TABLET ORAL
COMMUNITY
Start: 2021-03-23 | End: 2022-01-03

## 2021-06-10 RX ORDER — CYCLOBENZAPRINE HCL 10 MG
TABLET ORAL
COMMUNITY
Start: 2021-03-23 | End: 2022-01-03 | Stop reason: ALTCHOICE

## 2021-06-10 RX ORDER — BACLOFEN 10 MG/1
5-10 TABLET ORAL
Qty: 90 TABLET | Refills: 0 | Status: SHIPPED | OUTPATIENT
Start: 2021-06-10 | End: 2022-01-03 | Stop reason: SDUPTHER

## 2021-06-10 NOTE — PROGRESS NOTES
Arias Blackburn presents today for   Chief Complaint   Patient presents with    Back Pain     mid-lower       Is someone accompanying this pt? no    Is the patient using any DME equipment during OV? no    Coordination of Care:  1. Have you been to the ER, urgent care clinic since your last visit? no  Hospitalized since your last visit? no    2. Have you seen or consulted any other health care providers outside of the [address] [place] [address] since your last visit? Yes, pcp Include any pap smears or colon screening.  no

## 2021-06-10 NOTE — PROGRESS NOTES
[address]  [place], [address] [place] [address] Street  Phone: [phone]  Fax: [phone]        Shawn Mckinnon  : 1985  PCP: Tati Frye NP    PROGRESS NOTE      ASSESSMENT AND PLAN    Diagnoses and all orders for this visit:    1. Spondylolisthesis at L5-S1 level, grade 2  -     MRI LUMB SPINE WO CONT; Future  -     baclofen (LIORESAL) 10 mg tablet; Take 0.5-1 Tablets by mouth three (3) times daily as needed for Muscle Spasm(s). -     REFERRAL TO PHYSICAL THERAPY  -     AMB POC XRAY, SPINE, LUMBOSACRAL; 4+    2. Chronic thoracic back pain, unspecified back pain laterality  -     baclofen (LIORESAL) 10 mg tablet; Take 0.5-1 Tablets by mouth three (3) times daily as needed for Muscle Spasm(s). -     REFERRAL TO PHYSICAL THERAPY         1Chinedu Caballero is a 30 y.o. male presents with increased low back pain for the last 1 to 2 years. He has failed anti-inflammatories. He has left EHL weakness. 2. He needs a L MRI for further evaluation of spondylolisthesis and radiculitis. Poss sx. 3. Trial of baclofen and lieu of Flexeril, this should be less sedating. 4. Physical therapy for core strengthening and lumbopelvic sacral stabilization. Follow-up and Dispositions    · Return for f/u diagnostic test.            HISTORY OF PRESENT ILLNESS  Lucho Batista is seen today in consultation at the request of RITA Ramos for complaints of low back pain. Pain Assessment  6/10/2021   Location of Pain Back   Location Modifiers -   Severity of Pain 8   Quality of Pain Throbbing;Aching;Grinding; Other (Comment)   Quality of Pain Comment stabbing, tingling   Duration of Pain Persistent   Frequency of Pain Constant   Aggravating Factors Other (Comment)   Aggravating Factors Comment just all the time   Limiting Behavior Yes   Relieving Factors Ice   Relieving Factors Comment -   Result of Injury -   Work-Related Injury -     PCP notes reviewed. He denies any recent trauma. He was diagnosed with scoliosis in about 2019. Pain has now gotten worse and is daily and persistent. It is gotten so bad that he had to leave his job as a  last month. Onset of pain: Early 2020,     Does patient have numbness/tingling in extremities: No    Denies persistent fevers, chills, weight changes, saddle paresthesias, and neurogenic bowel or bladder symptoms. Investigations:   6/10/2021 I reviewed4 views of the lumbar spine, AP lateral flexion extension. mild scoliosis. Grade II listhesis L5-S1, minor motion on flexion and extension. Spine surgery consult: no    Treatments:  Physical therapy: PT January 2021 with no benefit, feels he did not go long enough  Spinal injections: Trigger point injection helped for a few days  Spinal surgery-no  Beneficial medications: Takes occasional ibuprofen. Takes Flexeril to sleep but does not want to be sedated during the day. Reports pain relief with cannabis  Failed medications: no    Work Status: Worked as  at [employer], last worked April 10, 2021  Pertinent PMHx:  Healthy  Review of systems is positive for red blood in his bowels x1 week several months ago. Off balance, chronic progressive. Reports heaviness in his left leg. Physical Exam:   Visit Vitals  Pulse 65   Temp 97.6 °F (36.4 °C) (Tympanic)   Ht 5' 10\" (1.778 m)   Wt 222 lb (100.7 kg)   SpO2 97% Comment: RA   BMI 31.85 kg/m²     Young athletic appearing male in no acute distress. He has prominence of his left lower rib cage. He is tender to palpation at his left lumbar paraspinals. He is tender midline at L5-S1. He has excellent lumbar flexion. He has pain with all range of motion. No difficulty with tandem gait. He endorses back pain with supported heel rise and toe rise. Lower extremity strength is intact 5 out of 5 except for his left EHL which is 4 out of 5. Straight leg raise is positive on the left at 90 degrees. No edema is noted. No hyperreflexia no clonus. No past medical history on file. No past surgical history on file. Current Outpatient Medications   Medication Sig Dispense Refill    cyclobenzaprine (FLEXERIL) 10 mg tablet TAKE 1 TABLET BY MOUTH ONCE DAILY AT BEDTIME AS NEEDED FOR 20 DAYS      HYDROcodone-acetaminophen (NORCO) 5-325 mg per tablet TAKE 1 TABLET BY MOUTH EVERY 6 HOURS AS NEEDED FOR 10 DAYS      baclofen (LIORESAL) 10 mg tablet Take 0.5-1 Tablets by mouth three (3) times daily as needed for Muscle Spasm(s). 90 Tablet 0    ibuprofen (MOTRIN) 800 mg tablet Take 800 mg by mouth every eight (8) hours as needed. Mr. Mireille Velasco may have a reminder for a \"due or due soon\" health maintenance. I have asked that he contact his primary care provider for follow-up on this health maintenance. This note was created using Dragon transcription software and may contain unintended errors.

## 2021-06-10 NOTE — PATIENT INSTRUCTIONS
Spondylolysis and Spondylolisthesis: Exercises Introduction Here are some examples of exercises for you to try. The exercises may be suggested for a condition or for rehabilitation. Start each exercise slowly. Ease off the exercises if you start to have pain. You will be told when to start these exercises and which ones will work best for you. How to do the exercises Single knee-to-chest  
1. Lie on your back with your knees bent and your feet flat on the floor. You can put a small pillow under your head and neck if it is more comfortable. 2. Bring one knee to your chest, keeping the other foot flat on the floor. 3. Keep your lower back pressed to the floor. Hold for 15 to 30 seconds. 4. Relax, and lower the knee to the starting position. 5. Repeat with the other leg. Repeat 2 to 4 times with each leg. 6. To get more stretch, put your other leg flat on the floor while pulling your knee to your chest.   
Double knee-to-chest  
1. Lie on your back with your knees bent and your feet flat on the floor. You can put a small pillow under your head and neck if it is more comfortable. 2. Bring both knees to your chest. 
3. Keep your lower back pressed to the floor. Hold for 15 to 30 seconds. 4. Relax, and lower your knees to the starting position. 5. Repeat 2 to 4 times. Alternate arm and leg (bird dog) exercise Do this exercise slowly. Try to keep your body straight at all times. 1. Start on the floor, on your hands and knees. 2. Tighten your belly muscles by pulling your belly button in toward your spine. Be sure you continue to breathe normally and do not hold your breath. 3. Raise one arm off the floor, and hold it straight out in front of you. Be careful not to let your shoulder drop down, because that will twist your trunk. 4. Hold for about 6 seconds, then lower your arm and switch to your other arm. 5. Repeat 8 to 12 times on each arm.  
6. When you can do this exercise with ease and no pain, repeat steps 1 through 5. But this time do it with one leg raised off the floor, holding your leg straight out behind you. Be careful not to let your hip drop down, because that will twist your trunk. 7. When holding your leg straight out becomes easier, try raising your opposite arm at the same time, and repeat steps 1 through 5. Bridging 1. Lie on your back with both knees bent. Your knees should be bent about 90 degrees. 2. Then push your feet into the floor, squeeze your buttocks, and lift your hips off the floor until your shoulders, hips, and knees are all in a straight line. 3. Hold for about 6 seconds as you continue to breathe normally, and then slowly lower your hips back down to the floor and rest for up to 10 seconds. 4. Repeat 8 to 12 times. Curl-ups 1. Lie on the floor on your back with your knees bent at a 90-degree angle. Your feet should be flat on the floor, about 12 inches from your buttocks. 2. Cross your arms over your chest. If this bothers your neck, try putting your hands behind your neck (not your head), with your elbows spread apart. 3. Slowly tighten your belly muscles and raise your shoulder blades off the floor. 4. Keep your head in line with your body, and do not press your chin to your chest. 
5. Hold this position for 1 or 2 seconds, then slowly lower yourself back down to the floor. 6. Repeat 8 to 12 times. Racheal Russell Do this exercise slowly. Try to keep your body straight at all times, and do not let one hip drop lower than the other. 1. Lie on your stomach, resting your upper body on your forearms. 2. Tighten your belly muscles by pulling your belly button in toward your spine. 3. Keeping your knees on the floor, press down with your forearms to lift your upper body off the floor. 4. Hold for about 6 seconds, then lower your body to the floor. Rest for up to 10 seconds. 5. Repeat 8 to 12 times.  
6. Over time, work up to holding for 15 to 30 seconds each time. 
7. If this exercise is easy to do with your knees on the floor, try doing this exercise with your knees and legs straight, supported by your toes on the floor. Follow-up care is a key part of your treatment and safety. Be sure to make and go to all appointments, and call your doctor if you are having problems. It's also a good idea to know your test results and keep a list of the medicines you take. Where can you learn more? Go to http://www.Andre Phillipe/ Enter 031-773-468 in the search box to learn more about \"Spondylolysis and Spondylolisthesis: Exercises. \" Current as of: November 16, 2020               Content Version: 12.8 © 2006-2021 [employer]. Care instructions adapted under license by [facility] (which disclaims liability or warranty for this information). If you have questions about a medical condition or this instruction, always ask your healthcare professional. [place] [address] any warranty or liability for your use of this information.

## 2021-06-10 NOTE — PROGRESS NOTES
Verbal order entered per Dr. Berta Mustafa as documented on blue sheet: 1 physical therapy- spondylolisthesis 2. Baclofen 10 mg - 1/2-1 po tid prn pain. Disp#90, 0 RF  3.  Lumbar MRI- progressive LBP, grade 2 spondylosis

## 2021-06-21 ENCOUNTER — TELEPHONE (OUTPATIENT)
Dept: ORTHOPEDIC SURGERY | Age: 36
End: 2021-06-21

## 2021-06-23 NOTE — TELEPHONE ENCOUNTER
Mr. Emma Crum has not returned my call. Orders have been faxed to [facility] central scheduling. He can self-schedule by calling them at  [phone].  [facility] pre-authorization is [id number], effective 06/23/21-08/07/21
Reached voice mail identified as \"Gale\" and I left a generic message providing my direct number requesting a return call. I am attempting to schedule a MRI of the Lumbar Spine. Mr. Laurel Aviles would like to attend [facility] but he also has the option of [address] from [place].
Severity of illness

## 2022-01-03 ENCOUNTER — OFFICE VISIT (OUTPATIENT)
Dept: ORTHOPEDIC SURGERY | Age: 37
End: 2022-01-03
Payer: MEDICAID

## 2022-01-03 VITALS
OXYGEN SATURATION: 97 % | TEMPERATURE: 97.3 F | BODY MASS INDEX: 31.78 KG/M2 | HEIGHT: 70 IN | HEART RATE: 70 BPM | WEIGHT: 222 LBS

## 2022-01-03 DIAGNOSIS — M43.17 SPONDYLOLISTHESIS AT L5-S1 LEVEL: Primary | ICD-10-CM

## 2022-01-03 PROCEDURE — 99213 OFFICE O/P EST LOW 20 MIN: CPT | Performed by: PHYSICAL MEDICINE & REHABILITATION

## 2022-01-03 RX ORDER — IBUPROFEN 800 MG/1
800 TABLET ORAL
Qty: 90 TABLET | Refills: 1 | Status: SHIPPED | OUTPATIENT
Start: 2022-01-03

## 2022-01-03 RX ORDER — BACLOFEN 10 MG/1
5-10 TABLET ORAL
Qty: 90 TABLET | Refills: 1 | Status: SHIPPED | OUTPATIENT
Start: 2022-01-03

## 2022-01-03 NOTE — PROGRESS NOTES
Olya Tello presents today for   Chief Complaint   Patient presents with    Back Pain       Is someone accompanying this pt? no    Is the patient using any DME equipment during OV? no        Coordination of Care:  1. Have you been to the ER, urgent care clinic since your last visit? no  Hospitalized since your last visit? no    2. Have you seen or consulted any other health care providers outside of the [address] [place] [address] since your last visit? no Include any pap smears or colon screening.  no

## 2022-01-03 NOTE — PROGRESS NOTES
[facility]  [address], [address]  [place], [address] [place] [address] Street  Phone: [phone]  Fax: [phone]        Dorothy Navarro  : 1985  PCP: Enrrique Ang NP    PROGRESS NOTE      ASSESSMENT AND PLAN    Diagnoses and all orders for this visit:    1. Spondylolisthesis at L5-S1 level, grade 2  -     ibuprofen (MOTRIN) 800 mg tablet; Take 1 Tablet by mouth every eight (8) hours as needed for Pain.  -     baclofen (LIORESAL) 10 mg tablet; Take 0.5-1 Tablets by mouth nightly as needed for Muscle Spasm(s). -     AMB SUPPLY ORDER    2. Chronic thoracic back pain, unspecified back pain laterality        1. Kandice Whitten is a 29 y.o. male w/stable listhesis, mechanical LBP  2. No dead lifts, squats, or leg press. Recommend Lats, pecs, quad sets, hamstring curls, and lunges. 3. Rx for LSO. Advised usage of LSO as needed for increased activities or severe pain. 4. RF Ibuprofen and Baclofen for prn use          HISTORY OF PRESENT ILLNESS      Lucho Batista is a 29 y.o. male presents for follow up of back. LV 2021, given trial of Baclofen. He never picked up the Baclofen prescription. He reports constant pain. He changed jobs, does less lifting. He pain has increased slightly since he started working. He has intermittent numbness and tingling BLE. Had L MRI since last visit, reviewed w/pt. He takes Ibuprofen PRN with benefit. Denies dyspepsia. + episodic BRBPR. Wants RFs    Denies red flags including persistent fevers, chills, weight changes, neurogenic bowel or bladder symptoms. Pain Assessment  1/3/2022   Location of Pain Back   Location Modifiers -   Severity of Pain 8   Quality of Pain Sharp; Other (Comment)   Quality of Pain Comment stabbing   Duration of Pain Persistent   Frequency of Pain Constant   Aggravating Factors Other (Comment)   Aggravating Factors Comment depends on the way I move   Limiting Behavior Yes   Relieving Factors Other (Comment);NSAID   Relieving Factors Comment laying down when I can   Result of Injury No   Work-Related Injury -         Investigations:   6/10/2021 I reviewed4 views of the lumbar spine, AP lateral flexion extension. mild scoliosis. Grade II listhesis L5-S1, minor motion on flexion and extension. L MRI 2021: grade 2 listheisis L5-S1, chronic lysis with severe foraminal stenosis   Spine surgery consult: no     Treatments:  Physical therapy: PT January 2021 with no benefit, feels he did not go long enough  Spinal injections: Trigger point injection helped for a few days  Spinal surgery-no  Beneficial medications: Takes occasional ibuprofen. Takes Flexeril to sleep but does not want to be sedated during the day. Reports pain relief with cannabis  Failed medications: no     Work Status: Works at [employer] . Worked as  at [employer], until April 10, 2021  Pertinent PMHx:  Healthy  Review of systems is positive for red blood in his bowels x1 week several months ago. Off balance, chronic progressive. Reports heaviness in his left leg.     PHYSICAL EXAMINATION    Visit Vitals  Pulse 70   Temp 97.3 °F (36.3 °C) (Temporal)   Ht 5' 10\" (1.778 m)   Wt 222 lb (100.7 kg)   SpO2 97% Comment: RA   BMI 31.85 kg/m²     Intact tandem gait  LE strength intact  SLR neg  Hamstring tightness              Written by Rudy Pan, as dictated by Shanae Khan MD.

## 2022-01-03 NOTE — LETTER
1/3/2022    Patient: Adalid Cristina   YOB: 1985   Date of Visit: 1/3/2022     Nagi Gilman NP  [facility] [address]    Dear Nagi Gilman NP,      Thank you for referring Mr. Mega Cornejo to [address] [place] [address] for evaluation. My notes for this consultation are attached. If you have questions, please do not hesitate to call me. I look forward to following your patient along with you.       Sincerely,    Jan Cobb MD

## 2022-03-19 PROBLEM — M43.17 SPONDYLOLISTHESIS AT L5-S1 LEVEL: Status: ACTIVE | Noted: 2022-01-03

## 2023-01-24 ENCOUNTER — NURSE TRIAGE (OUTPATIENT)
Dept: OTHER | Facility: CLINIC | Age: 38
End: 2023-01-24

## 2023-01-24 NOTE — TELEPHONE ENCOUNTER
Location of patient: [facility]    Received call from Bao black at [facility] with [employer]. Subjective: Caller states \"bloody stool\"     Current Symptoms: bloody stool red stool bright red intermittent, once or twice a week some abd pain mild, also c/o back pain hx sciolosis no dizziness states bloody stools occuring more frequently    Pt is un established     Onset: months    Associated Symptoms: NA    Pain Severity:     Temperature: none     What has been tried: nothing    LMP: NA Pregnant: NA    Recommended disposition: See PCP within 3 Days    Care advice provided, patient verbalizes understanding; denies any other questions or concerns; instructed to call back for any new or worsening symptoms. Patient/Caller agrees with recommended disposition; writer provided warm transfer to [facility] at [facility] for appointment scheduling    Attention Provider: Thank you for allowing me to participate in the care of your patient. The patient was connected to triage in response to information provided to the ECC. Please do not respond through this encounter as the response is not directed to a shared pool.       Reason for Disposition   MILD rectal bleeding (more than just a few drops or streaks)    Protocols used: Rectal Bleeding-ADULT-[place]

## 2023-02-06 ENCOUNTER — OFFICE VISIT (OUTPATIENT)
Dept: ORTHOPEDIC SURGERY | Age: 38
End: 2023-02-06
Payer: MEDICAID

## 2023-02-06 VITALS
RESPIRATION RATE: 16 BRPM | TEMPERATURE: 98.1 F | HEART RATE: 85 BPM | WEIGHT: 221.8 LBS | OXYGEN SATURATION: 100 % | SYSTOLIC BLOOD PRESSURE: 133 MMHG | BODY MASS INDEX: 31.75 KG/M2 | HEIGHT: 70 IN | DIASTOLIC BLOOD PRESSURE: 73 MMHG

## 2023-02-06 DIAGNOSIS — M54.32 LEFT SIDED SCIATICA: ICD-10-CM

## 2023-02-06 DIAGNOSIS — M51.36 DDD (DEGENERATIVE DISC DISEASE), LUMBAR: ICD-10-CM

## 2023-02-06 DIAGNOSIS — M43.17 SPONDYLOLISTHESIS AT L5-S1 LEVEL: Primary | ICD-10-CM

## 2023-02-06 RX ORDER — BACLOFEN 10 MG/1
5-10 TABLET ORAL
Qty: 90 TABLET | Refills: 1 | Status: SHIPPED | OUTPATIENT
Start: 2023-02-06

## 2023-02-06 RX ORDER — IBUPROFEN 800 MG/1
800 TABLET ORAL
Qty: 90 TABLET | Refills: 2 | Status: SHIPPED | OUTPATIENT
Start: 2023-02-06

## 2023-02-06 NOTE — PROGRESS NOTES
[address], [address]  [place], [facility] [address]  Phone: [phone]  Fax: [phone]        Max Cough  : 1985  PCP: Marybeth Moore NP (Inactive)    PROGRESS NOTE      ASSESSMENT AND PLAN    Diagnoses and all orders for this visit:    1. Spondylolisthesis at L5-S1 level, grade 2  -     REFERRAL TO PHYSICAL THERAPY  -     ibuprofen (MOTRIN) 800 mg tablet; Take 1 Tablet by mouth every eight (8) hours as needed for Pain.  -     baclofen (LIORESAL) 10 mg tablet; Take 0.5-1 Tablets by mouth nightly as needed for Muscle Spasm(s). 2. DDD (degenerative disc disease), lumbar  -     AMB POC XRAY, SPINE, LUMBOSACRAL; 4+  -     REFERRAL TO PHYSICAL THERAPY    3. Left sided sciatica      Octavio Nicole is a 40 y.o. 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 LBP>>sciatica  Advised usage of LSO as needed for increased activities or severe pain. RF as needed Baclofen and Ibuprofen. No Ibuprofen if any GI bleeding issues  Referral to Physical Therapy for advanced core work  Advised to continue activity as tolerated  Discussed postural awareness. Injections/surgery if not improving. May have his medications filled through PCP. Will need follow-up appointment if we are still filling his medication. Advised PCP/GI for intermittent rectal bleeding. Follow-up and Dispositions    Return in about 6 months (around 2023) for medication management. HISTORY OF PRESENT ILLNESS      Ras Arias is a 40 y.o. male presents for follow up of back pain. LV 2022, given rx for LSO. Pt reports worsening low back pain radiating into his left posterolateral thigh x 4 months. Back > leg pain. Denies injury or trauma. His pain is exacerbated with movement. 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 Pt uses his back brace as needed.     He takes Baclofen and Ibuprofen PRN with benefit. Denies side effects. He is compliant with his HEP. Denies red flags including persistent fevers, chills, weight changes, neurogenic bowel or bladder symptoms. Of note, he has occasional blood in his stools, bright red, about once a month. Denies history of hemorrhoids. Denies dyspepsia. .     Pt denies recent ED visits or hospitalizations. Pain Assessment  2/6/2023   Location of Pain Back   Location Modifiers -   Severity of Pain 7   Quality of Pain Dull;Aching; [place] [employer]; Other (Comment)   Quality of Pain Comment stabbing   Duration of Pain Persistent   Frequency of Pain Constant   Aggravating Factors Other (Comment)   Aggravating Factors Comment any movement   Limiting Behavior Some   Relieving Factors Other (Comment)   Relieving Factors Comment -   Result of Injury No   Work-Related Injury -            Investigations:   L XR AP/lat/flex/ext 4V 2/6/2023 (I personally reviewed these images): moderate lumbar scoliosis, grade 2 listhesis L5-S1, DDD L3/4/5  6/10/2021 I reviewed 4 views of the lumbar spine, AP lateral flexion extension. mild scoliosis. Grade II listhesis L5-S1, minor motion on flexion and extension. L MRI 2021: grade 2 listheisis L5-S1, chronic lysis with severe foraminal stenosis   Spine surgery consult: no     Treatments:  Physical therapy: PT January 2021 with no benefit, feels he did not go long enough  Spinal injections: Trigger point injection helped for a few days  Spinal surgery-no  Beneficial medications: Takes occasional ibuprofen. Reports pain relief with cannabis. Baclofen   Failed medications: no     Work Status: [employer] . Temp jobs: Previously worked at [employer] . Worked as  at [employer], 2021  Pertinent PMHx: Off balance, chronic progressive. Reports heaviness in his left leg.  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    PHYSICAL EXAMINATION    Visit Vitals  /73   Pulse 85   Temp 98.1 °F (36.7 °C) (Temporal)   Resp 16   Ht 5' 10\" (1.778 m)   Wt 221 lb 12.8 oz (100.6 kg)   SpO2 100%   BMI 31.82 kg/m²       Excellent lumbar flexion, pain with extension,   Radiating LLE pain with heel walk  Intact tandem gait  Diffusely tender T/L to L/S junction  Tender left upper trap  Intact squat.             Written by Martha Tom, as dictated by Mony Pisano MD.

## 2023-02-10 ENCOUNTER — HOSPITAL ENCOUNTER (OUTPATIENT)
Dept: PHYSICAL THERAPY | Age: 38
Discharge: HOME | End: 2023-02-10
Payer: MEDICAID

## 2023-02-10 PROCEDURE — 97162 PT EVAL MOD COMPLEX 30 MIN: CPT

## 2023-02-10 NOTE — THERAPY EVALUATION
[address],[place], [facility] - Phone: [phone]  Fax: [phone] / [address] [facility]  Patient Name: Antonio Cody : 1985   Medical   Diagnosis: Low Back Pain; L5-S1 Sponylolisthesis; DDD Treatment Diagnosis: Other low back pain [M54.59]   Onset Date: 3 years ago     Referral Source: Blanca Erickson MD [facility]): 2/10/2023   Prior Hospitalization: See medical history Provider #: [id number]   Prior Level of Function: Ind and less pain for ADLs and activities of leisure   Comorbidities: Scoliosis. Medications: Verified on Patient Summary List   The Plan of Care and following information is based on the information from the initial evaluation.   ===========================================================================================  Assessment / key information:  Patient is a 40year old male. Primary complaints of lower back pain that began 3 years ago due to diagnosis of scoliosis. Locates pain at lower lumbar L>R and some occasional L sided radicular symptoms to above knee. Rates pain as 5-10/10 from best to worst. Pain today is rated as 7/10. Describes pain as dull and achy in his low back and sharp shooting when the radicular pain happens and constant in nature. Ice, heat and estim/Tens Unit makes the pain better. Prolonged standing on L LE, sitting in unsupportive chairs, and bending forwards repetitively makes the pain worse. 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  Their primary goals in PT are to improve core strength and stability and to reduce his lower back pain to more manageable levels in order to take part in these activities of leisure. Patient recently saw their referring provider who prescribed OPPT. They are scheduled to return to their provider on a PRN basis.  ===========================================================================================  Objective Measures:  Observation: Pleasant and cooperative male. Appears stated age. Athletic and muscular build. Flat sneakers/tennis shoes for footwear. Posture: Good and upright; slight curvature noted with [employer]. Patient unsure which way his scoliosis curves. Palpation: Left sided lumbar paraspinals, and QL very tight and hypertonic. SIJ mobs on left hypomobile when compared to right side. ROM: Flexion full; Extension 75%; Sbing full; Rot full \"tight\"  Strength: 5/5 MMT grossly; glutes with less muscle tone on Left side. Minimal loss of tone on right. Difficulty performing 100% bridge x 2 reps with SIJ assessment. Flexibility: Hamstrings WNL. Piriformis moderately impaired bilaterally. Hip IR moderately impaired. Adductors minimally impaired R>L. Skin Integrity: Intact  Swelling/Edema: None appreciated  SI Joint Assessment: Neutral alignment noted. No pelvic obliquities noted. Positional Tolerances: Tolerates all positions fairly.   FOTO Score: 62/100 Goal ---> 71/100    HEP/Patient Education Provided:  - Discussed diagnosis and anatomy involved; educated on inflammation and pain cycles  - Discussed PT POC and goals, expectations of attendance and compliance with HEP  - Discussed no show/cancellation policy and to alert PT to any changes  - Discussed ice versus heat and instructed patient to ICE 3-4x/day x 10-15 minutes per session    ===========================================================================================  Eval Complexity: History MEDIUM  Complexity : 1-2 comorbidities / personal factors will impact the outcome/ POC ;  Examination  MEDIUM Complexity : 3 Standardized tests and measures addressing body structure, function, activity limitation and / or participation in recreation ; Presentation MEDIUM Complexity : Evolving with changing characteristics ; Decision Making MEDIUM Complexity : FOTO score of 26-74; Overall Complexity MEDIUM  Problem List: pain affecting function, decrease ROM, decrease strength, impaired gait/ balance, decrease ADL/ functional abilitiies, decrease activity tolerance, decrease flexibility/ joint mobility, and decrease transfer abilities   Treatment Plan may include any combination of the following: Therapeutic exercise, Neuromuscular reeducation, Manual therapy, Therapeutic activity, Self care/home management, Electric stim unattended , Aquatic therapy, Gait training, Ultrasound, Mechanical traction, Needle insertion w/o injection (1 or 2 muscles), and Needle insertion w/o injection (3+ muscles)  Patient / Family readiness to learn indicated by: asking questions, trying to perform skills, and interest  Persons(s) to be included in education: patient (P)  Barriers to Learning/Limitations: None  Measures taken, if barriers to learning:    Patient Goal (s): Patient would like to return to working out, 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 Patient will reduce max pain to 5/10 in order to perform iADLs and ADLs more comfortably. Patient will improve glute strength to 4+/5 (20 reps at 50% bridge) for prolonged periods of walking and standing. Patient will GROC score to 2+ for perception of improvement and QoL. 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   Patient will improve glute strength to 5/5 (20 single leg bridges bilaterally at 100%) to return to lifting and coaching. Patient will improve FOTO score to 71 for improved functional mobility and to return to PLOF. Frequency / Duration:   Patient to be seen  1-2  times per week for 6  weeks:  Patient / Caregiver education and instruction: self care, activity modification, and exercises  Therapist Signature: Noel Lackey PT Date: 3/24/0337   Certification Period: [facility] Time: 7:33 AM   ===========================================================================================  I certify that the above Physical Therapy Services are being furnished while the patient is under my care. I agree with the treatment plan and certify that this therapy is necessary. Physician Signature:        Date:       Time:                                        Dustin Bruno MD  Please sign and return to [facility] at [facility] [place]. or you may fax the signed copy to [phone]. Thank you.

## 2023-02-10 NOTE — PROGRESS NOTES
PHYSICAL THERAPY - DAILY TREATMENT NOTE    Patient Name: Paramjit Kay        Date: 2/10/2023  : 1985   yes Patient  Verified  Visit #:      12  Insurance: Payor: Patsy Barney / Plan: [address] [address] / Product Type: Managed Care Medicaid /      In time: 11:00 am Out time: 11:45 am   Total Treatment Time: 45     Medicare/BCBS Time Tracking (below)   Total Timed Codes (min):  5 1:1 Treatment Time:  45     TREATMENT AREA =  Other low back pain [M54.59]    SUBJECTIVE  Pain Level (on 0 to 10 scale):  7   10   Medication Changes/New allergies or changes in medical history, any new surgeries or procedures?    no  If yes, update Summary List   Subjective Functional Status/Changes:  []  No changes reported     See POC          OBJECTIVE    5 min Self Care: Discussed diagnosis, prognosis, POC/PT Expectations (HEP performance and regular attendance), HEP/exercises, ice versus heat. Rationale:    Patient Education to improve the patients ability to to improve patient's ability to perform functional mobility tasks and activities. Billed With/As:   [] TE   [] TA   [] Neuro   [] Self Care Patient Education: [x] Review HEP    [] Progressed/Changed HEP based on:   [] positioning   [] body mechanics   [] transfers   [] heat/ice application    [] other:      Other Objective/Functional Measures:    See POC     Post Treatment Pain Level (on 0 to 10) scale:   7  / 10     ASSESSMENT  Assessment/Changes in Function:     See POC. []  See Progress Note/Recertification   Patient will continue to benefit from skilled PT services to modify and progress therapeutic interventions, address functional mobility deficits, address ROM deficits, address strength deficits, analyze and address soft tissue restrictions, analyze and cue movement patterns, analyze and modify body mechanics/ergonomics, and assess and modify postural abnormalities to attain remaining goals.    Progress toward goals / Updated goals:    See POC     PLAN  [x]  Upgrade activities as tolerated yes Continue plan of care   []  Discharge due to :    []  Other:      Therapist: Kayla Jesus PT    Date: 2/10/2023 Time: 7:33 AM     Future Appointments   Date Time Provider Neida Claire   2/10/2023 11:00 AM Karmen Lefort, PT [id number]

## 2023-02-24 ENCOUNTER — HOSPITAL ENCOUNTER (OUTPATIENT)
Facility: HOSPITAL | Age: 38
Setting detail: RECURRING SERIES
Discharge: HOME | End: 2023-02-27
Payer: MEDICAID

## 2023-02-24 PROCEDURE — 97110 THERAPEUTIC EXERCISES: CPT

## 2023-02-24 PROCEDURE — 97530 THERAPEUTIC ACTIVITIES: CPT

## 2023-02-24 PROCEDURE — 97112 NEUROMUSCULAR REEDUCATION: CPT

## 2023-02-24 PROCEDURE — 97535 SELF CARE MNGMENT TRAINING: CPT

## 2023-02-24 NOTE — PROGRESS NOTES
PHYSICAL / OCCUPATIONAL THERAPY - DAILY TREATMENT NOTE (updated )    Patient Name: Huber Mcclain    Date: 2023    : 1985  Insurance: Payor: Fabián 58 / Plan: [address] [id number] / Product Type: *No Product type* /      Patient  verified Yes     Visit #   Current / Total 2 12   Time   In / Out 200 253   Pain   In / Out 7 5   Subjective Functional Status/Changes: Patient reports he is at a constant 7/10 pain. Says that he has difficulty finding relief other than pain medication. Changes to:  Meds, Allergies, Med Hx, Sx Hx? If yes, update Summary List no       TREATMENT AREA =      OBJECTIVE         Therapeutic Procedures: Tx Min Billable or 1:1 Min (if diff from Tx Min) Procedure, Rationale, Specifics   18  84222 Therapeutic Exercise (timed):  increase ROM, strength, coordination, balance, and proprioception to improve patient's ability to progress to PLOF and address remaining functional goals. (see flow sheet as applicable)     Details if applicable:       10  92724 Neuromuscular Re-Education (timed):  improve balance, coordination, kinesthetic sense, posture, core stability and proprioception to improve patient's ability to develop conscious control of individual muscles and awareness of position of extremities in order to progress to PLOF and address remaining functional goals. (see flow sheet as applicable)     Details if applicable:     5 NB  21138 Manual Therapy (timed):  decrease pain, increase ROM, increase tissue extensibility, decrease trigger points, and increase postural awareness to improve patient's ability to progress to PLOF and address remaining functional goals. The manual therapy interventions were performed at a separate and distinct time from the therapeutic activities interventions .  (see flow sheet as applicable)     Details if applicable:  STM: L l/s paraspinals,QL   8  14481 Therapeutic Activity (timed):  use of dynamic activities replicating functional movements to increase ROM, strength, coordination, balance, and proprioception in order to improve patient's ability to progress to PLOF and address remaining functional goals. (see flow sheet as applicable)     Details if applicable:     12  53107 Self Care/Home Management (timed):  improve patient knowledge and understanding of pain reducing techniques, positioning, posture/ergonomics, home safety, activity modification, diagnosis/prognosis, and physical therapy expectations, procedures and progression  to improve patient's ability to progress to PLOF and address remaining functional goals. (see flow sheet as applicable)     Details if applicable:  Discussed POC, Reviewed HEP, pt EDU on TrP release with lacrosse ball   48  [facility] Totals Reminder: bill using total billable min of TIMED therapeutic procedures (example: do not include dry needle or estim unattended, both untimed codes, in totals to left)  8-22 min = 1 unit; 23-37 min = 2 units; 38-52 min = 3 units; 53-67 min = 4 units; 68-82 min = 5 units   Total Total     [x]  Patient Education billed concurrently with other procedures   [x] Review HEP    [] Progressed/Changed HEP, detail:  1st Follow up HEP given  [] Other detail:       Objective Information/Functional Measures/Assessment    See Flow Sheet    Chart reviewed and subjective taken. Pt requires cues and instruction for all drills, form, therapeutic focus, and carryover. Followed up on HEP plan to ensure compliance.      Patient will continue to benefit from skilled PT / OT services to modify and progress therapeutic interventions, analyze and address functional mobility deficits, analyze and address ROM deficits, analyze and address strength deficits, analyze and address soft tissue restrictions, analyze and cue for proper movement patterns, analyze and modify for postural abnormalities, analyze and address imbalance/dizziness, and instruct in home and community integration to address functional deficits and attain remaining goals.     Progress toward goals / Updated goals:  []  See Progress Note/Recertification    First follow up since IE    PLAN  Yes  Continue plan of care  [x]  Upgrade activities as tolerated  []  Discharge due to :  []  Other:    Catarino Romano, ZANDER    2/24/2023    2:59 PM    Future Appointments   Date Time Provider Ritesh Bowles   3/1/2023 10:00 AM Lula Elias [facility]   3/3/2023 11:00 AM Lula Sunni [id number]

## 2023-03-01 ENCOUNTER — HOSPITAL ENCOUNTER (OUTPATIENT)
Facility: HOSPITAL | Age: 38
Setting detail: RECURRING SERIES
Discharge: HOME | End: 2023-03-04
Payer: MEDICAID

## 2023-03-01 PROCEDURE — 97530 THERAPEUTIC ACTIVITIES: CPT

## 2023-03-01 PROCEDURE — 97110 THERAPEUTIC EXERCISES: CPT

## 2023-03-01 PROCEDURE — 97112 NEUROMUSCULAR REEDUCATION: CPT

## 2023-03-01 NOTE — PROGRESS NOTES
PHYSICAL / OCCUPATIONAL THERAPY - DAILY TREATMENT NOTE (updated )    Patient Name: Sy Romeo    Date: 3/1/2023    : 1985  Insurance: Payor: Fabián 58 / Plan: [address] [id number] / Product Type: *No Product type* /      Patient  verified Yes     Visit #   Current / Total 3 12   Time   In / Out 1234 106   Pain   In / Out 6 5   Subjective Functional Status/Changes: Patient reports noncompliance with HEP. Changes to:  Meds, Allergies, Med Hx, Sx Hx? If yes, update Summary List no       TREATMENT AREA =      OBJECTIVE         Therapeutic Procedures: Tx Min Billable or 1:1 Min (if diff from Tx Min) Procedure, Rationale, Specifics   12  91241 Therapeutic Exercise (timed):  increase ROM, strength, coordination, balance, and proprioception to improve patient's ability to progress to PLOF and address remaining functional goals. (see flow sheet as applicable)     Details if applicable:       10  81903 Neuromuscular Re-Education (timed):  improve balance, coordination, kinesthetic sense, posture, core stability and proprioception to improve patient's ability to develop conscious control of individual muscles and awareness of position of extremities in order to progress to PLOF and address remaining functional goals. (see flow sheet as applicable)     Details if applicable:       70208 Manual Therapy (timed):  decrease pain, increase ROM, increase tissue extensibility, decrease trigger points, and increase postural awareness to improve patient's ability to progress to PLOF and address remaining functional goals. The manual therapy interventions were performed at a separate and distinct time from the therapeutic activities interventions .  (see flow sheet as applicable)     Details if applicable:  STM: L l/s paraspinals,QL   10  41124 Therapeutic Activity (timed):  use of dynamic activities replicating functional movements to increase ROM, strength, coordination, balance, and proprioception in order to improve patient's ability to progress to PLOF and address remaining functional goals. (see flow sheet as applicable)     Details if applicable:       01919 Self Care/Home Management (timed):  improve patient knowledge and understanding of pain reducing techniques, positioning, posture/ergonomics, home safety, activity modification, diagnosis/prognosis, and physical therapy expectations, procedures and progression  to improve patient's ability to progress to PLOF and address remaining functional goals. (see flow sheet as applicable)     Details if applicable:     48  [facility] Totals Reminder: bill using total billable min of TIMED therapeutic procedures (example: do not include dry needle or estim unattended, both untimed codes, in totals to left)  8-22 min = 1 unit; 23-37 min = 2 units; 38-52 min = 3 units; 53-67 min = 4 units; 68-82 min = 5 units   Total Total     [x]  Patient Education billed concurrently with other procedures   [x] Review HEP    [x] Progressed/Changed HEP, detail: Added palloff press  [] Other detail:       Objective Information/Functional Measures/Assessment    See Flow Sheet    Patient tolerated most drills this visit. Some instability with planks and increased VC/TC for form. He responded well to prone push ups and lock in sag noting that pain had decreased from 6/10 to 5/10. Pt did note during session that he is able to perform close to 50 push ups with his son. Will continue to progress towards pain-free motion as tolerated and per POC.     Patient will continue to benefit from skilled PT / OT services to modify and progress therapeutic interventions, analyze and address functional mobility deficits, analyze and address ROM deficits, analyze and address strength deficits, analyze and address soft tissue restrictions, analyze and cue for proper movement patterns, analyze and modify for postural abnormalities, analyze and address imbalance/dizziness, and instruct in home and community integration to address functional deficits and attain remaining goals. Progress toward goals / Updated goals:  []  See Progress Note/Recertification  Increased HEP this visit - discussed compliance with pt as he noted he has not been. All goals remain applicable at this time.     PLAN  Yes  Continue plan of care  [x]  Upgrade activities as tolerated  []  Discharge due to :  []  Other:    Ashish Villavicencio, PTA    3/1/2023    12:39 PM    Future Appointments   Date Time Provider Ritesh Bowles   3/3/2023 11:00 AM Lula Romelia [id number]

## 2023-03-17 ENCOUNTER — HOSPITAL ENCOUNTER (OUTPATIENT)
Facility: HOSPITAL | Age: 38
Setting detail: RECURRING SERIES
Discharge: HOME | End: 2023-03-20
Payer: MEDICAID

## 2023-03-17 PROCEDURE — 97110 THERAPEUTIC EXERCISES: CPT

## 2023-03-17 PROCEDURE — 97535 SELF CARE MNGMENT TRAINING: CPT

## 2023-03-17 PROCEDURE — 97112 NEUROMUSCULAR REEDUCATION: CPT

## 2023-03-17 NOTE — PROGRESS NOTES
Therapeutic Activity (timed):  use of dynamic activities replicating functional movements to increase ROM, strength, coordination, balance, and proprioception in order to improve patient's ability to progress to PLOF and address remaining functional goals. (see flow sheet as applicable)     Details if applicable:     10  41568 Self Care/Home Management (timed):  improve patient knowledge and understanding of pain reducing techniques, positioning, posture/ergonomics, home safety, activity modification, diagnosis/prognosis, and physical therapy expectations, procedures and progression  to improve patient's ability to progress to PLOF and address remaining functional goals. (see flow sheet as applicable)     Details if applicable:  Discussed POC, educated/coached pt through deadlift form. 27  [facility] Totals Reminder: bill using total billable min of TIMED therapeutic procedures (example: do not include dry needle or estim unattended, both untimed codes, in totals to left)  8-22 min = 1 unit; 23-37 min = 2 units; 38-52 min = 3 units; 53-67 min = 4 units; 68-82 min = 5 units   Total Total     [x]  Patient Education billed concurrently with other procedures   [x] Review HEP    [] Progressed/Changed HEP, detail:  [] Other detail:       Objective Information/Functional Measures/Assessment    See Flow Sheet    SEE PN    Patient will continue to benefit from skilled PT / OT services to modify and progress therapeutic interventions, analyze and address functional mobility deficits, analyze and address ROM deficits, analyze and address strength deficits, analyze and address soft tissue restrictions, analyze and cue for proper movement patterns, analyze and modify for postural abnormalities, analyze and address imbalance/dizziness, and instruct in home and community integration to address functional deficits and attain remaining goals.     Progress toward goals / Updated goals:  []  See Progress Note/Recertification  SEE

## 2023-03-17 NOTE — THERAPY RECERTIFICATION
[facility]ALTH[facility]ARE [facility] PL / Plan: [address] [place] [address] [id number] / Product Type: *No Product type* /     Non-Medicare, can change goals, can adjust or add frequency duration, no signature required      Continue addressing unmet goals above along with new goals to be achieved in __4__ weeks (8 visits remaining in original POC)  1. 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 Patient will improve glute strength to 5/5 (20 single leg bridges bilaterally at 100%) to return to lifting and coaching. 3. Patient will improve FOTO score to 71 for improved functional mobility and to return to PLOF. Frequency / Duration:   Patient to be seen   2   times per week for   4    weeks:    RECOMMENDATIONS  We would like to continue therapy for progress to goals stated above. Continue per initial Plan of Care. If you have any questions/comments please contact us directly. Thank you for allowing us to assist in the care of your patient.     Kody Kaur, ZANDER       3/17/2023       6:27 PM

## 2023-03-20 ENCOUNTER — HOSPITAL ENCOUNTER (OUTPATIENT)
Facility: HOSPITAL | Age: 38
Setting detail: RECURRING SERIES
Discharge: HOME | End: 2023-03-23
Payer: MEDICAID

## 2023-03-20 PROCEDURE — 97112 NEUROMUSCULAR REEDUCATION: CPT

## 2023-03-20 PROCEDURE — 97110 THERAPEUTIC EXERCISES: CPT

## 2023-03-20 PROCEDURE — 97530 THERAPEUTIC ACTIVITIES: CPT

## 2023-03-20 NOTE — PROGRESS NOTES
PHYSICAL / OCCUPATIONAL THERAPY - DAILY TREATMENT NOTE (updated )    Patient Name: Marlen Horne    Date: 3/20/2023    : 1985  Insurance: Payor: Fabián Mullins / Plan: [address] [place] [address] [id number] / Product Type: *No Product type* /      Patient  verified Yes     Visit #   Current / Total 5 12   Time   In / Out 1135 1205   Pain   In / Out 5 4   Subjective Functional Status/Changes: Patient he is sore today due to playing with kids this past weekend. Changes to:  Meds, Allergies, Med Hx, Sx Hx? If yes, update Summary List no       TREATMENT AREA =  LBP with L>R radicular; scoliosis, DDD, L5/S1 spondy    OBJECTIVE         Therapeutic Procedures: Tx Min Billable or 1:1 Min (if diff from Tx Min) Procedure, Rationale, Specifics   10  65922 Therapeutic Exercise (timed):  increase ROM, strength, coordination, balance, and proprioception to improve patient's ability to progress to PLOF and address remaining functional goals. (see flow sheet as applicable)     Details if applicable:       10  74171 Neuromuscular Re-Education (timed):  improve balance, coordination, kinesthetic sense, posture, core stability and proprioception to improve patient's ability to develop conscious control of individual muscles and awareness of position of extremities in order to progress to PLOF and address remaining functional goals. (see flow sheet as applicable)     Details if applicable:       50177 Manual Therapy (timed):  decrease pain, increase ROM, increase tissue extensibility, decrease trigger points, and increase postural awareness to improve patient's ability to progress to PLOF and address remaining functional goals. The manual therapy interventions were performed at a separate and distinct time from the therapeutic activities interventions .  (see flow sheet as applicable)     Details if applicable:  STM: L l/s paraspinals,QL   10  86219 Therapeutic Activity (timed):  use of dynamic

## 2023-03-27 ENCOUNTER — HOSPITAL ENCOUNTER (OUTPATIENT)
Facility: HOSPITAL | Age: 38
Setting detail: RECURRING SERIES
Discharge: HOME | End: 2023-03-30
Payer: MEDICAID

## 2023-03-27 PROCEDURE — 97530 THERAPEUTIC ACTIVITIES: CPT

## 2023-03-27 PROCEDURE — 97110 THERAPEUTIC EXERCISES: CPT

## 2023-03-27 PROCEDURE — 97112 NEUROMUSCULAR REEDUCATION: CPT

## 2023-03-27 NOTE — PROGRESS NOTES
modify for postural abnormalities, analyze and address imbalance/dizziness, and instruct in home and community integration to address functional deficits and attain remaining goals. Progress toward goals / Updated goals:  []  See Progress Note/Recertification  Progressing towards strength goal with inc in intensity with deadlifts and front squats - 3/27/23  1. 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 Patient will improve glute strength to 5/5 (20 single leg bridges bilaterally at 100%) to return to lifting and coaching. 3. Patient will improve FOTO score to 71 for improved functional mobility and to return to PLOF.    PLAN  Yes  Continue plan of care  [x]  Upgrade activities as tolerated  []  Discharge due to :  []  Other:    Camilo Atkinson PTA    3/27/2023    11:19 AM    Future Appointments   Date Time Provider Ritesh Bowles   3/31/2023 10:00 AM [facility] 2300

## 2023-03-31 ENCOUNTER — HOSPITAL ENCOUNTER (OUTPATIENT)
Facility: HOSPITAL | Age: 38
Setting detail: RECURRING SERIES
End: 2023-03-31
Payer: MEDICAID

## 2023-03-31 PROCEDURE — 97112 NEUROMUSCULAR REEDUCATION: CPT

## 2023-03-31 PROCEDURE — 97110 THERAPEUTIC EXERCISES: CPT

## 2023-03-31 PROCEDURE — 97530 THERAPEUTIC ACTIVITIES: CPT

## 2023-03-31 NOTE — PROGRESS NOTES
PHYSICAL / OCCUPATIONAL THERAPY - DAILY TREATMENT NOTE (updated )    Patient Name: Lyric Garcia    Date: 3/31/2023    : 1985  Insurance: Payor: Shlel Webb / Plan: [address] [id number] / Product Type: *No Product type* /      Patient  verified Yes     Visit #   Current / Total 7 12   Time   In / Out 1005 1037   Pain   In / Out 5 3   Subjective Functional Status/Changes: Patient report reduction in pain today. Says that he is always sore post pt sessions. Says that he is starting a new job next week which may require him to be standing for long periods of time. 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 Changes to:  Meds, Allergies, Med Hx, Sx Hx? If yes, update Summary List no       TREATMENT AREA =  LBP with L>R radicular; scoliosis, DDD, L5/S1 spondy    OBJECTIVE         Therapeutic Procedures: Tx Min Billable or 1:1 Min (if diff from Tx Min) Procedure, Rationale, Specifics   12  94180 Therapeutic Exercise (timed):  increase ROM, strength, coordination, balance, and proprioception to improve patient's ability to progress to PLOF and address remaining functional goals. (see flow sheet as applicable)     Details if applicable:       10  70270 Neuromuscular Re-Education (timed):  improve balance, coordination, kinesthetic sense, posture, core stability and proprioception to improve patient's ability to develop conscious control of individual muscles and awareness of position of extremities in order to progress to PLOF and address remaining functional goals. (see flow sheet as applicable)     Details if applicable:       55709 Manual Therapy (timed):  decrease pain, increase ROM, increase tissue extensibility, decrease trigger points, and increase postural awareness to improve patient's ability to progress to PLOF and address remaining functional goals.   The manual therapy interventions were performed at a separate and distinct time from the therapeutic activities interventions . (see flow sheet as applicable)     Details if applicable:  STM: L l/s paraspinals,QL, op ppu   10  14746 Therapeutic Activity (timed):  use of dynamic activities replicating functional movements to increase ROM, strength, coordination, balance, and proprioception in order to improve patient's ability to progress to PLOF and address remaining functional goals. (see flow sheet as applicable)     Details if applicable:       87916 Self Care/Home Management (timed):  improve patient knowledge and understanding of pain reducing techniques, positioning, posture/ergonomics, home safety, activity modification, diagnosis/prognosis, and physical therapy expectations, procedures and progression  to improve patient's ability to progress to PLOF and address remaining functional goals. (see flow sheet as applicable)     Details if applicable:     28  [facility] Totals Reminder: bill using total billable min of TIMED therapeutic procedures (example: do not include dry needle or estim unattended, both untimed codes, in totals to left)  8-22 min = 1 unit; 23-37 min = 2 units; 38-52 min = 3 units; 53-67 min = 4 units; 68-82 min = 5 units   Total Total     [x]  Patient Education billed concurrently with other procedures   [x] Review HEP    [] Progressed/Changed HEP, detail:  [] Other detail:       Objective Information/Functional Measures/Assessment  ADDED:  1/2 kneel hi/lo chop, side planks, SL RDL, Lateral walks 3/27/23  ADDED:HE squats, SL Squat to bench, RFESS, figure 4 LTR. . (Warm up = hip mini band series, lat stretch with TRX, worlds greatest stretch) 3/31/23    See Flow Sheet    Pt showing continual improvement in mobility as demonstrated by his ability to perform worlds greatest stretch with no inc in sx reported. He was able to perform SL STS, RFESS, and SL bridges with no sx inc and only minimal VC/TC.   Pt continues to

## 2023-05-19 ENCOUNTER — OFFICE VISIT (OUTPATIENT)
Age: 38
End: 2023-05-19
Payer: MEDICAID

## 2023-05-19 VITALS
RESPIRATION RATE: 18 BRPM | HEART RATE: 75 BPM | WEIGHT: 219.8 LBS | HEIGHT: 70 IN | TEMPERATURE: 97.6 F | OXYGEN SATURATION: 99 % | DIASTOLIC BLOOD PRESSURE: 76 MMHG | SYSTOLIC BLOOD PRESSURE: 118 MMHG | BODY MASS INDEX: 31.47 KG/M2

## 2023-05-19 DIAGNOSIS — M43.17 SPONDYLOLISTHESIS, LUMBOSACRAL REGION: Primary | ICD-10-CM

## 2023-05-19 DIAGNOSIS — M79.18 MYOFASCIAL PAIN: ICD-10-CM

## 2023-05-19 DIAGNOSIS — M54.32 SCIATICA, LEFT SIDE: ICD-10-CM

## 2023-05-19 DIAGNOSIS — M51.36 OTHER INTERVERTEBRAL DISC DEGENERATION, LUMBAR REGION: ICD-10-CM

## 2023-05-19 PROCEDURE — 99213 OFFICE O/P EST LOW 20 MIN: CPT | Performed by: NURSE PRACTITIONER

## 2023-05-19 RX ORDER — LIDOCAINE 50 MG/G
PATCH TOPICAL
COMMUNITY
Start: 2019-12-12 | End: 2023-05-19 | Stop reason: SDUPTHER

## 2023-05-19 RX ORDER — LIDOCAINE 50 MG/G
PATCH TOPICAL
Qty: 30 PATCH | Refills: 2 | Status: SHIPPED | OUTPATIENT
Start: 2023-05-19

## 2023-05-19 RX ORDER — BACLOFEN 10 MG/1
5-10 TABLET ORAL NIGHTLY PRN
Qty: 30 TABLET | Refills: 5 | Status: SHIPPED | OUTPATIENT
Start: 2023-05-19

## 2023-05-19 NOTE — PROGRESS NOTES
Nico Terry presents today for   Chief Complaint   Patient presents with    Back Problem    Pain    Back Pain       Is someone accompanying this pt? no    Is the patient using any DME equipment during OV? no    Depression Screening:  No flowsheet data found. Learning Assessment:  No flowsheet data found. Abuse Screening:  No flowsheet data found. Fall Risk  No flowsheet data found. OPIOID RISK TOOL  No flowsheet data found. Coordination of Care:  1. Have you been to the ER, urgent care clinic since your last visit? no  Hospitalized since your last visit? no    2. Have you seen or consulted any other health care providers outside of the [address] [place] [address] since your last visit? no Include any pap smears or colon screening.  no
pertinent family history. Physical Exam:  /76 (Site: Left Upper Arm, Position: Sitting, Cuff Size: Medium Adult)   Pulse 75   Temp 97.6 °F (36.4 °C) (Temporal)   Resp 18   Ht 5' 10\" (1.778 m)   Wt 219 lb 12.8 oz (99.7 kg)   SpO2 99% Comment: RA  BMI 31.54 kg/m²   Pain Scale: 8/10      We have informed Julio Ho to notify us for immediate appointment if he has any worsening neurogical symptoms or if an emergency situation presents, then call 911    Please note that this dictation was completed with [employer], the computer voice recognition software. Quite often unanticipated grammatical, syntax, homophones, and other interpretive errors are inadvertently transcribed by the computer software. Please disregard these errors. Please excuse any errors that have escaped final proofreading.      RICARDO Caicedo - NP

## 2023-07-20 ENCOUNTER — CLINICAL DOCUMENTATION (OUTPATIENT)
Facility: HOSPITAL | Age: 38
End: 2023-07-20

## 2023-07-20 NOTE — THERAPY DISCHARGE
[facility] THERAPY  [address], [address] [place] [facility] - Ph: [phone]  Fx: [phone]  PHYSICAL THERAPY DISCHARGE NOTE            Patient Name: Sherryle Click : 1985   Treatment/Medical Diagnosis: Other low back pain [M54.59]   Referral Source: Chetna Loera MD       Date of Initial Visit: 2/10/23 Attended Visits: 7 Missed Visits: 3      SUMMARY OF TREATMENT  Pt was seen for IE and 6 f/u visits with treatment consisting of therapeutic exercise, therapeutic activity, neuromuscular activity, manual therapy, activity modification/progression, and Pt ED to improve functional mobility and strength along with instruction of HEP. CURRENT STATUS  Pt was seen for 2 visit after last PN on 3/20/23 and then did not return following their visit on 3/27/23. They will be DC at this time due to not returning. Below are comments and goals from last PN. Pt has made slow progress in PT due outside circumstances that have interfered with consistency in care. Patient reports max pain levels at 7/10 and average pain level of 4-5/10. Pt reports 25-30% improvement since beginning therapy. Pt does notes that he has more time to devote to therapy now that his circumstances have changed. Pt notes he is now completing his HEP as prescribed. Pt will continue to benefit from skilled PT to progress exercises and improve upon functional mobility and strength. Patient will show compliance with regular HEP performance as prescribed by PT. Status at last Eval: Goal Established  Current Status: Recently reporting regular compliance  Goal Met?  no     2. Patient will reduce max pain to 5/10 in order to perform iADLs and ADLs more comfortably. Status at last Eval: 10/10  Current Status: 7/10  Goal Met?  no     3. Patient will improve glute strength to 4+/5 (20 reps at 50% bridge) for prolonged periods of walking and standing.   Status at last Eval: Difficulty

## 2024-10-15 SDOH — HEALTH STABILITY: PHYSICAL HEALTH: ON AVERAGE, HOW MANY DAYS PER WEEK DO YOU ENGAGE IN MODERATE TO STRENUOUS EXERCISE (LIKE A BRISK WALK)?: 0 DAYS

## 2024-10-15 SDOH — HEALTH STABILITY: PHYSICAL HEALTH: ON AVERAGE, HOW MANY MINUTES DO YOU ENGAGE IN EXERCISE AT THIS LEVEL?: 0 MIN

## 2024-10-18 ENCOUNTER — OFFICE VISIT (OUTPATIENT)
Facility: CLINIC | Age: 39
End: 2024-10-18

## 2024-10-18 ENCOUNTER — HOSPITAL ENCOUNTER (OUTPATIENT)
Facility: HOSPITAL | Age: 39
Setting detail: SPECIMEN
Discharge: HOME | End: 2024-10-21
Payer: COMMERCIAL

## 2024-10-18 VITALS
HEART RATE: 68 BPM | WEIGHT: 223.6 LBS | RESPIRATION RATE: 14 BRPM | OXYGEN SATURATION: 97 % | TEMPERATURE: 97.7 F | HEIGHT: 70 IN | BODY MASS INDEX: 32.01 KG/M2 | DIASTOLIC BLOOD PRESSURE: 83 MMHG | SYSTOLIC BLOOD PRESSURE: 127 MMHG

## 2024-10-18 DIAGNOSIS — Z11.59 ENCOUNTER FOR HEPATITIS C SCREENING TEST FOR LOW RISK PATIENT: ICD-10-CM

## 2024-10-18 DIAGNOSIS — Z13.1 ENCOUNTER FOR SCREENING FOR DIABETES MELLITUS: ICD-10-CM

## 2024-10-18 DIAGNOSIS — E66.09 CLASS 1 OBESITY DUE TO EXCESS CALORIES WITH SERIOUS COMORBIDITY AND BODY MASS INDEX (BMI) OF 32.0 TO 32.9 IN ADULT: ICD-10-CM

## 2024-10-18 DIAGNOSIS — Z13.6 ENCOUNTER FOR SCREENING FOR CORONARY ARTERY DISEASE: ICD-10-CM

## 2024-10-18 DIAGNOSIS — Z76.89 ENCOUNTER TO ESTABLISH CARE WITH NEW DOCTOR: Primary | ICD-10-CM

## 2024-10-18 DIAGNOSIS — E66.811 CLASS 1 OBESITY DUE TO EXCESS CALORIES WITH SERIOUS COMORBIDITY AND BODY MASS INDEX (BMI) OF 32.0 TO 32.9 IN ADULT: ICD-10-CM

## 2024-10-18 DIAGNOSIS — M43.17 SPONDYLOLISTHESIS AT L5-S1 LEVEL: ICD-10-CM

## 2024-10-18 DIAGNOSIS — Z13.220 SCREENING CHOLESTEROL LEVEL: ICD-10-CM

## 2024-10-18 DIAGNOSIS — M25.561 ACUTE PAIN OF RIGHT KNEE: ICD-10-CM

## 2024-10-18 DIAGNOSIS — G89.29 CHRONIC PAIN OF LEFT KNEE: ICD-10-CM

## 2024-10-18 DIAGNOSIS — Z11.4 ENCOUNTER FOR SCREENING FOR HIV: ICD-10-CM

## 2024-10-18 DIAGNOSIS — Z76.89 ENCOUNTER TO ESTABLISH CARE WITH NEW DOCTOR: ICD-10-CM

## 2024-10-18 DIAGNOSIS — M25.562 CHRONIC PAIN OF LEFT KNEE: ICD-10-CM

## 2024-10-18 DIAGNOSIS — M79.18 MYOFASCIAL PAIN: ICD-10-CM

## 2024-10-18 LAB
ALBUMIN SERPL-MCNC: 3.7 G/DL (ref 3.4–5)
ALBUMIN/GLOB SERPL: 1 (ref 0.8–1.7)
ALP SERPL-CCNC: 62 U/L (ref 45–117)
ALT SERPL-CCNC: 40 U/L (ref 16–61)
ANION GAP SERPL CALC-SCNC: 3 MMOL/L (ref 3–18)
AST SERPL-CCNC: 26 U/L (ref 10–38)
BILIRUB SERPL-MCNC: 0.6 MG/DL (ref 0.2–1)
BUN SERPL-MCNC: 10 MG/DL (ref 7–18)
BUN/CREAT SERPL: 10 (ref 12–20)
CALCIUM SERPL-MCNC: 9.2 MG/DL (ref 8.5–10.1)
CHLORIDE SERPL-SCNC: 105 MMOL/L (ref 100–111)
CHOLEST SERPL-MCNC: 159 MG/DL
CO2 SERPL-SCNC: 27 MMOL/L (ref 21–32)
CREAT SERPL-MCNC: 0.99 MG/DL (ref 0.6–1.3)
ERYTHROCYTE [DISTWIDTH] IN BLOOD BY AUTOMATED COUNT: 12.3 % (ref 11.6–14.5)
EST. AVERAGE GLUCOSE BLD GHB EST-MCNC: 114 MG/DL
GLOBULIN SER CALC-MCNC: 3.6 G/DL (ref 2–4)
GLUCOSE SERPL-MCNC: 93 MG/DL (ref 74–99)
HBA1C MFR BLD: 5.6 % (ref 4.2–5.6)
HCT VFR BLD AUTO: 39.1 % (ref 36–48)
HDLC SERPL-MCNC: 51 MG/DL (ref 40–60)
HDLC SERPL: 3.1 (ref 0–5)
HGB BLD-MCNC: 12.7 G/DL (ref 13–16)
LDLC SERPL CALC-MCNC: 94.2 MG/DL (ref 0–100)
LIPID PANEL: NORMAL
MCH RBC QN AUTO: 31.4 PG (ref 24–34)
MCHC RBC AUTO-ENTMCNC: 32.5 G/DL (ref 31–37)
MCV RBC AUTO: 96.8 FL (ref 78–100)
NRBC # BLD: 0 K/UL (ref 0–0.01)
NRBC BLD-RTO: 0 PER 100 WBC
PLATELET # BLD AUTO: 289 K/UL (ref 135–420)
PMV BLD AUTO: 10.2 FL (ref 9.2–11.8)
POTASSIUM SERPL-SCNC: 4.3 MMOL/L (ref 3.5–5.5)
PROT SERPL-MCNC: 7.3 G/DL (ref 6.4–8.2)
RBC # BLD AUTO: 4.04 M/UL (ref 4.35–5.65)
SODIUM SERPL-SCNC: 135 MMOL/L (ref 136–145)
TRIGL SERPL-MCNC: 69 MG/DL
VLDLC SERPL CALC-MCNC: 13.8 MG/DL
WBC # BLD AUTO: 4.6 K/UL (ref 4.6–13.2)

## 2024-10-18 PROCEDURE — 85027 COMPLETE CBC AUTOMATED: CPT

## 2024-10-18 PROCEDURE — 83036 HEMOGLOBIN GLYCOSYLATED A1C: CPT

## 2024-10-18 PROCEDURE — 36415 COLL VENOUS BLD VENIPUNCTURE: CPT

## 2024-10-18 PROCEDURE — 87389 HIV-1 AG W/HIV-1&-2 AB AG IA: CPT

## 2024-10-18 PROCEDURE — 80053 COMPREHEN METABOLIC PANEL: CPT

## 2024-10-18 PROCEDURE — 86803 HEPATITIS C AB TEST: CPT

## 2024-10-18 PROCEDURE — 80061 LIPID PANEL: CPT

## 2024-10-18 RX ORDER — ACETAMINOPHEN 500 MG
500 TABLET ORAL 4 TIMES DAILY PRN
Qty: 360 TABLET | Refills: 1 | Status: SHIPPED | OUTPATIENT
Start: 2024-10-18

## 2024-10-18 RX ORDER — IBUPROFEN 800 MG/1
800 TABLET, FILM COATED ORAL EVERY 8 HOURS PRN
Qty: 120 TABLET | Status: CANCELLED | OUTPATIENT
Start: 2024-10-18

## 2024-10-18 RX ORDER — MELOXICAM 15 MG/1
15 TABLET ORAL DAILY
Qty: 90 TABLET | Refills: 2 | Status: SHIPPED | OUTPATIENT
Start: 2024-10-18

## 2024-10-18 RX ORDER — MELOXICAM 15 MG/1
15 TABLET ORAL DAILY
COMMUNITY
Start: 2024-10-07 | End: 2024-10-18 | Stop reason: SDUPTHER

## 2024-10-18 RX ORDER — BACLOFEN 10 MG/1
5-10 TABLET ORAL NIGHTLY PRN
Qty: 30 TABLET | Refills: 5 | Status: SHIPPED | OUTPATIENT
Start: 2024-10-18

## 2024-10-18 RX ORDER — DULOXETIN HYDROCHLORIDE 30 MG/1
30 CAPSULE, DELAYED RELEASE ORAL DAILY
Qty: 90 CAPSULE | Refills: 1 | Status: SHIPPED | OUTPATIENT
Start: 2024-10-18

## 2024-10-18 SDOH — ECONOMIC STABILITY: FOOD INSECURITY: WITHIN THE PAST 12 MONTHS, YOU WORRIED THAT YOUR FOOD WOULD RUN OUT BEFORE YOU GOT MONEY TO BUY MORE.: NEVER TRUE

## 2024-10-18 SDOH — ECONOMIC STABILITY: FOOD INSECURITY: WITHIN THE PAST 12 MONTHS, THE FOOD YOU BOUGHT JUST DIDN'T LAST AND YOU DIDN'T HAVE MONEY TO GET MORE.: NEVER TRUE

## 2024-10-18 SDOH — ECONOMIC STABILITY: INCOME INSECURITY: HOW HARD IS IT FOR YOU TO PAY FOR THE VERY BASICS LIKE FOOD, HOUSING, MEDICAL CARE, AND HEATING?: NOT HARD AT ALL

## 2024-10-18 ASSESSMENT — PATIENT HEALTH QUESTIONNAIRE - PHQ9
SUM OF ALL RESPONSES TO PHQ QUESTIONS 1-9: 1
2. FEELING DOWN, DEPRESSED OR HOPELESS: SEVERAL DAYS
1. LITTLE INTEREST OR PLEASURE IN DOING THINGS: NOT AT ALL
SUM OF ALL RESPONSES TO PHQ9 QUESTIONS 1 & 2: 1
SUM OF ALL RESPONSES TO PHQ QUESTIONS 1-9: 1

## 2024-10-18 NOTE — PATIENT INSTRUCTIONS
It was nice meeting you today.  Please have your labs done either immediately after this visit, or you can schedule at the  to come back for labs.  Please do your labs at least a week before your next appointment.    If you have questions or concerns, My Chart is the fastest way to get in touch with me and the clinical staff.    Keep in mind that [facility] policy schedules most appointments to last 15 minutes. If you have a new problem or multiple problems you can request 30 minutes.  If you arrive more than long-term through your scheduled appointment, staff may offer to reschedule you. This is to ensure you and the provider have enough time to complete a high quality encounter.  Please arrive to your appointments at least 10 minutes early to avoid any unforseen delays.     If you have trouble paying medical bills, please consider contacting [employer].  [employer] patient representatives are available to discuss government programs that provide assistance with medical bills to qualifying individuals and their families.  The services are provided free of charge to patients in need of assistance.  [employer] patient representatives can also assist you in completing and registering applications with appropriate agencies.  Please call the following number if you are interested: [phone].

## 2024-10-18 NOTE — PROGRESS NOTES
Kyle Orozco is a 39 y.o. year old male who presents today for   Chief Complaint   Patient presents with    New Patient        \"Have you been to the ER, urgent care clinic since your last visit?  Hospitalized since your last visit?\"   NO     “Have you seen or consulted any other health care providers outside our system since your last visit?”   NO           - MADY Mcfadden  [facility]  Phone: [phone]  Fax: [phone]

## 2024-10-18 NOTE — PROGRESS NOTES
History of Present Illness  Kyle Oroczo is a 39 y.o. male who presents today for management of    Chief Complaint   Patient presents with    New Patient     CC: new patient here for healthcare maintenance    -Patient is here to establish care.   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, he slept  - saw ortho ([facility]) and surgery was planned, but insurance subverted his care  - R knee; constant medial dull pain with positional sharp pain x 1 month   - work includes a lot of physical activity (pushing, pulling, picking up), which exacerbates pain     Social  - EtOH: occasion  - Tobacco: no  - Illicit drugs: marijuana    Healthcare maintenance, patient reported:  Vaccine records requested from previous PCP/pharmacy if available  Last Colonoscopy: no FHx       Social Determinants of Health     Tobacco Use: High Risk (10/18/2024)    Patient History     Smoking Tobacco Use: Light Smoker     Smokeless Tobacco Use: Never     Passive Exposure: Not on file   Alcohol Use: Not on file   Financial Resource Strain: Low Risk  (10/18/2024)    Overall Financial Resource Strain (CARDIA)     Difficulty of Paying Living Expenses: Not hard at all   Food Insecurity: No Food Insecurity (10/18/2024)    Hunger Vital Sign     Worried About Running Out of Food in the Last Year: Never true     Ran Out of Food in the Last Year: Never true   Transportation Needs: Unknown (10/18/2024)    PRAPARE - Transportation     Lack of Transportation (Medical): Not on file     Lack of Transportation (Non-Medical): No   Physical Activity: Inactive (10/15/2024)    Exercise Vital Sign     Days of Exercise per Week: 0 days     Minutes of Exercise per Session: 0 min   Stress: Not on file   Social Connections: Not on file   Intimate Partner Violence: Not on file   Depression:

## 2024-10-19 LAB
HCV AB SERPL QL IA: NORMAL
HCV IGG SERPL QL IA: NON REACTIVE S/CO RATIO

## 2024-10-21 LAB
HIV 1+2 AB+HIV1 P24 AG SERPL QL IA: NONREACTIVE
HIV 1/2 RESULT COMMENT: NORMAL

## 2024-10-28 NOTE — PROGRESS NOTES
Kyle Orozco  1985   Chief Complaint   Patient presents with    Knee Pain     Bilateral          HISTORY OF PRESENT ILLNESS  Kyle Orozco is a 39 y.o. male who presents today for reevaluation of bilateral knee pain. Pain is a 9/10. 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 He had an MRI and was told he had a torn meniscus and MCL/ACL sprain. He reports ongoing issues in the left knee and is wearing a hinged brace. He has also been having intense pain in the right knee now for the last month. 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 The knee swells and wakes him at night. The pain is constant  Patient denies any fever, chills, chest pain, shortness of breath or calf pain. The remainder of the review of systems is negative. There are no new illness or injuries other than that mentioned above to report since last seen in the office. No changes in medications, allergies, social or family history.      PHYSICAL EXAM:   Temp 97.8 °F (36.6 °C) (Temporal)   Ht 1.778 m (5' 10\")   Wt 130.6 kg (288 lb)   BMI 41.32 kg/m²   The patient is a well-developed, well-nourished male   in no acute distress.  The patient is alert and oriented times three.  The patient is alert and oriented times three. Mood and affect are normal.  LYMPHATIC: lymph nodes are not enlarged and are within normal limits  SKIN: normal in color and non tender to palpation. There are no bruises or abrasions noted.   NEUROLOGICAL: Motor sensory exam is within normal limits. Reflexes are equal bilaterally. There is normal sensation to pinprick and light touch  MUSCULOSKELETAL:  Examination Right knee   Skin Intact   Range of motion 0-130   Effusion -   Medial joint line tenderness +   Lateral joint line tenderness -   Tenderness Pes Bursa -   Tenderness insertion MCL -   Tenderness insertion LCL -   Vamsi’s -   Patella crepitus +   Patella grind -   Lachman -   Pivot shift

## 2024-10-29 ENCOUNTER — OFFICE VISIT (OUTPATIENT)
Age: 39
End: 2024-10-29
Payer: COMMERCIAL

## 2024-10-29 VITALS — HEIGHT: 70 IN | TEMPERATURE: 97.8 F | WEIGHT: 288 LBS | BODY MASS INDEX: 41.23 KG/M2

## 2024-10-29 DIAGNOSIS — M25.561 RIGHT KNEE PAIN, UNSPECIFIED CHRONICITY: Primary | ICD-10-CM

## 2024-10-29 PROCEDURE — 99213 OFFICE O/P EST LOW 20 MIN: CPT | Performed by: ORTHOPAEDIC SURGERY

## 2024-11-01 ENCOUNTER — OFFICE VISIT (OUTPATIENT)
Facility: CLINIC | Age: 39
End: 2024-11-01

## 2024-11-01 VITALS
OXYGEN SATURATION: 96 % | BODY MASS INDEX: 32.07 KG/M2 | WEIGHT: 224 LBS | SYSTOLIC BLOOD PRESSURE: 127 MMHG | RESPIRATION RATE: 15 BRPM | HEIGHT: 70 IN | DIASTOLIC BLOOD PRESSURE: 85 MMHG | TEMPERATURE: 98.3 F | HEART RATE: 69 BPM

## 2024-11-01 DIAGNOSIS — M25.551 CHRONIC RIGHT HIP PAIN: ICD-10-CM

## 2024-11-01 DIAGNOSIS — G89.29 CHRONIC RIGHT HIP PAIN: ICD-10-CM

## 2024-11-01 DIAGNOSIS — M25.561 ACUTE PAIN OF RIGHT KNEE: ICD-10-CM

## 2024-11-01 DIAGNOSIS — D64.9 ANEMIA, UNSPECIFIED TYPE: Primary | ICD-10-CM

## 2024-11-01 NOTE — PROGRESS NOTES
Kyle Orozco (:  1985) is a 39 y.o. male here for evaluation of the following chief complaint(s):  Follow-up        ASSESSMENT/PLAN:  1. Anemia, unspecified type  -     Ferritin; Future  -     Transferrin; Future  -     Iron and TIBC; Future  2. Acute pain of right knee  Assessment & Plan:  - seeing ortho  - plans for R knee MRI   3. Chronic right hip pain  Assessment & Plan:  - ortho yet to address hip; working on knee right now       Follow-up and Dispositions    Return in about 1 year (around 2025) for Annual.         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    /85 (Site: Left Upper Arm, Position: Sitting, Cuff Size: Large Adult)   Pulse 69   Temp 98.3 °F (36.8 °C) (Temporal)   Resp 15   Ht 1.778 m (5' 10\")   Wt 101.6 kg (224 lb)   SpO2 96%   BMI 32.14 kg/m²     Physical Exam          An electronic signature was used to authenticate this note.    --Fredo Martinez MD

## 2024-11-01 NOTE — PROGRESS NOTES
Kyle Orozco is a 39 y.o. year old male who presents today for   Chief Complaint   Patient presents with    Follow-up        \"Have you been to the ER, urgent care clinic since your last visit?  Hospitalized since your last visit?\"   no Where:     When:    Reason:      “Have you seen or consulted any other health care providers outside our system since your last visit?”   NO           Eliane Valente CMA  [facility]  Ph: [phone]  Fax: [phone]

## 2024-11-08 ENCOUNTER — HOSPITAL ENCOUNTER (OUTPATIENT)
Facility: HOSPITAL | Age: 39
Discharge: HOME | End: 2024-11-11
Payer: COMMERCIAL

## 2024-11-08 DIAGNOSIS — M25.561 RIGHT KNEE PAIN, UNSPECIFIED CHRONICITY: ICD-10-CM

## 2024-11-08 PROCEDURE — 73721 MRI JNT OF LWR EXTRE W/O DYE: CPT

## 2024-11-14 NOTE — PROGRESS NOTES
Kyle Orozco  1985   Chief Complaint   Patient presents with    Results     Right knee mri        HISTORY OF PRESENT ILLNESS  Kyle Orozco is a 39 y.o. male who presents today for reevaluation of bilateral knee pain. Pain is a 8/10. He notes his pain in his right knee are due to overcompensating for his left knee. 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 The knee swells and wakes him at night.  He feels that his left knee has worsened significantly since the initial injury    Patient denies any fever, chills, chest pain, shortness of breath or calf pain. The remainder of the review of systems is negative. There are no new illness or injuries other than that mentioned above to report since last seen in the office. No changes in medications, allergies, social or family history.      PHYSICAL EXAM:   Ht 1.778 m (5' 10\")   Wt 101.2 kg (223 lb)   BMI 32.00 kg/m²   The patient is a well-developed, well-nourished male   in no acute distress.  The patient is alert and oriented times three.  The patient is alert and oriented times three. Mood and affect are normal.  LYMPHATIC: lymph nodes are not enlarged and are within normal limits  SKIN: normal in color and non tender to palpation. There are no bruises or abrasions noted.   NEUROLOGICAL: Motor sensory exam is within normal limits. Reflexes are equal bilaterally. There is normal sensation to pinprick and light touch  MUSCULOSKELETAL: Inspection minimal swelling but significant tenderness of the medial joint line and the adductor tubercle on the left knee    PROCEDURE: none    IMAGING: MRI of the right knee obtained by [facility] on 11/08/2024 reviewed and read by :    IMPRESSION:     1. No meniscal tears. Mild ACL sprain.     2. Magnetic susceptibility artifact from metal in the lateral knee, clinical  correlation? Radiographs may be indicated.     3. Findings suggesting patellar tendon lateral condylar friction

## 2024-11-18 ENCOUNTER — OFFICE VISIT (OUTPATIENT)
Age: 39
End: 2024-11-18
Payer: COMMERCIAL

## 2024-11-18 VITALS — WEIGHT: 223 LBS | BODY MASS INDEX: 31.92 KG/M2 | HEIGHT: 70 IN

## 2024-11-18 DIAGNOSIS — S83.242D ACUTE MEDIAL MENISCUS TEAR OF LEFT KNEE, SUBSEQUENT ENCOUNTER: Primary | ICD-10-CM

## 2024-11-18 DIAGNOSIS — S83.411D SPRAIN OF MEDIAL COLLATERAL LIGAMENT OF RIGHT KNEE, SUBSEQUENT ENCOUNTER: ICD-10-CM

## 2024-11-18 DIAGNOSIS — S83.412D SPRAIN OF MEDIAL COLLATERAL LIGAMENT OF LEFT KNEE, SUBSEQUENT ENCOUNTER: ICD-10-CM

## 2024-11-18 PROCEDURE — 99214 OFFICE O/P EST MOD 30 MIN: CPT | Performed by: ORTHOPAEDIC SURGERY

## 2024-11-27 ENCOUNTER — HOSPITAL ENCOUNTER (OUTPATIENT)
Facility: HOSPITAL | Age: 39
Discharge: HOME | End: 2024-11-30
Attending: ORTHOPAEDIC SURGERY
Payer: COMMERCIAL

## 2024-11-27 DIAGNOSIS — S83.242D ACUTE MEDIAL MENISCUS TEAR OF LEFT KNEE, SUBSEQUENT ENCOUNTER: ICD-10-CM

## 2024-11-27 PROCEDURE — 73721 MRI JNT OF LWR EXTRE W/O DYE: CPT

## 2024-12-10 ENCOUNTER — OFFICE VISIT (OUTPATIENT)
Age: 39
End: 2024-12-10
Payer: COMMERCIAL

## 2024-12-10 VITALS — HEIGHT: 70 IN | BODY MASS INDEX: 31.92 KG/M2 | WEIGHT: 223 LBS

## 2024-12-10 DIAGNOSIS — S83.519A PARTIAL TEAR OF ANTERIOR CRUCIATE LIGAMENT OF KNEE: Primary | ICD-10-CM

## 2024-12-10 DIAGNOSIS — M22.2X1 PATELLOFEMORAL DISORDER, RIGHT: ICD-10-CM

## 2024-12-10 PROCEDURE — 99213 OFFICE O/P EST LOW 20 MIN: CPT | Performed by: ORTHOPAEDIC SURGERY

## 2024-12-10 NOTE — PROGRESS NOTES
none    IMAGING: MRI of the left knee obtained by [facility] on 12/03/2024 reviewed and read by :    IMPRESSION:     1.  Borderline patella jacob. Edema-like signal involving the superolateral  aspect of Hoffa's fat pad, as can be seen in the setting of fat pad impingement.  2.  No definite evidence for meniscal tear.  3.  Low-grade degenerative tear of the ACL at the tibial attachment, otherwise  largely intact.  4.  Trace joint effusion.    MRI of the right knee obtained by [facility] on 11/08/2024 reviewed and read by :     IMPRESSION:     1. No meniscal tears. Mild ACL sprain.     2. Magnetic susceptibility artifact from metal in the lateral knee, clinical  correlation? Radiographs may be indicated.     3. Findings suggesting patellar tendon lateral condylar friction syndrome.     XR of the right knee with 2 views obtained in office on 10/23/2024 reviewed and read by : loss of joint space in the medial compartment     XR of the left knee with 4 views obtained by [facility] on 05/20/2024 reviewed and read by : loss of joint space in the medial compartment. Patella jacob     FINDINGS:     BONES: Unremarkable.     SOFT TISSUES: Unremarkable.      MRI of the left knee obtained by MRI and CT diagnostic center reviewed and read by :    IMPRESSION:      ICD-10-CM    1. Partial tear of anterior cruciate ligament of knee  S83.519A [facility]      2. Patellofemoral disorder, right  M22.2X1 [facility]           PLAN:   1. Pt presents today with bilateral knee pain secondary to patellofemoral disorder in both knees and a very low-grade ACL sprain on the left.. As of now I will order PT for him to begin rehab for his knees. I encourage him to continue taking Mobic as needed for inflammatory pain.     2. No cortisone injection indicated today   3. Yes Physical/Occupational Therapy indicated today  4. No

## 2025-01-03 ENCOUNTER — HOSPITAL ENCOUNTER (OUTPATIENT)
Facility: HOSPITAL | Age: 40
Setting detail: RECURRING SERIES
Discharge: HOME | End: 2025-01-06
Payer: COMMERCIAL

## 2025-01-03 PROCEDURE — 97161 PT EVAL LOW COMPLEX 20 MIN: CPT

## 2025-01-03 NOTE — PROGRESS NOTES
PT DAILY TREATMENT NOTE/KNEE EVAL -      Patient Name: Kyle Orozco    Date: 1/3/2025    : 1985  Insurance: Payor: AETNA / Plan: AETNA / Product Type: *No Product type* /      Patient  verified yes     Visit #   Current / Total 1 6-12   Time   In / Out 1130 1200   Pain   In / Out 8 8   Subjective Functional Status/Changes: See POC     Treatment Area: Pain in left knee [M25.562]    30 min [x]Eval  - untimed               See POC  HEP Provided    [x]  Patient Education billed concurrently with other procedures       Pain Level (0-10 scale) post treatment: 8    ASSESSMENT/Changes in Function: see POC    Patient will continue to benefit from skilled PT services to modify and progress therapeutic interventions, analyze and address functional mobility deficits, analyze and address ROM deficits, analyze and address strength deficits, analyze and address soft tissue restrictions, analyze and cue for proper movement patterns, analyze and modify for postural abnormalities, and instruct in home and community integration to address functional deficits and attain remaining goals.       [x]  See Plan of Care for goals and reassessment       PLAN  []  Upgrade activities as tolerated     []  Continue plan of care  []  Update interventions per flow sheet       []  Other:Sol Diana, PT 1/3/2025  11:28 AM    Justification for Eval Code Complexity:  Patient History : Scoliosis  Examination see exam Medium  Clinical Presentation: Low  Clinical Decision Making : Low    If an interpreting service was utilized for treatment of this patient, the contents of this document represent the material reviewed with the patient via the .      
having an impact on the patient's strength, balance, gait, transfers, self care, and ADL's), and 95042 Gait Training  Patient / Family readiness to learn indicated by: asking questions, trying to perform skills, and interest  Persons(s) to be included in education: patient (P)  Barriers to Learning/Limitations: none  Measures taken if barriers to learning present: NA  Patient Goal (s): \"To feel better and get stronger  Patient Self Reported Health Status: fair  Rehabilitation Potential: good    Short Term Goals: To be accomplished in 2 weeks   Pt will be compliant with HEP for symptom management at home.  Status at IE HEP Provided  2.    Pt will demonstrate left knee extension AROM to at least 5/5 to improve stance phase.  Status at IE 4+/5    Long Term Goals: To be accomplished in 4 weeks   Pt will be independent with HEP at D/C for self management.  Status at IE NA  2.  Pt will demonstrate left hip abd strength of at least 5/5 to engage in age appropriate activities.   Status at IE 4-/5  3.  Pt will step down test on LLE of 0/7 to improve stair negotiation.  Status at IE 4/7  4.  Pt will increase LEFS score to 71 to decrease functional limitations.  Status at IE 62/80  5. Pt will report minimal to no difficulty with pivoting to return to PLOF.     Frequency / Duration: Patient would benefit from skilled PT 1-2 times per week for 6 weeks.    Patient/ Caregiver education and instruction: Diagnosis, prognosis, self care and activity modification [x]  Plan of care has been reviewed with ZANDER Diana, EVAT, OCS, Cert. DN       1/3/2025       11:28 AM  ===================================================================  I certify that the above Therapy Services are being furnished while the patient is under my care. I agree with the treatment plan and certify that this therapy is necessary.    Physician's Signature:_________________________   DATE:_________   TIME:________

## 2025-01-10 ENCOUNTER — HOSPITAL ENCOUNTER (OUTPATIENT)
Facility: HOSPITAL | Age: 40
Setting detail: RECURRING SERIES
Discharge: HOME | End: 2025-01-13
Payer: COMMERCIAL

## 2025-01-10 PROCEDURE — 97110 THERAPEUTIC EXERCISES: CPT

## 2025-01-10 PROCEDURE — 97535 SELF CARE MNGMENT TRAINING: CPT

## 2025-01-10 PROCEDURE — 97530 THERAPEUTIC ACTIVITIES: CPT

## 2025-01-10 NOTE — PROGRESS NOTES
[x] Review HEP    [x] Progressed/Changed HEP  [] Other:    Objective Information/Functional Measures/Assessment  Initiated treatment protocol per POC.  Provided green t-band for HEP    Patient will continue to benefit from skilled PT services to modify and progress therapeutic interventions, analyze and address functional mobility deficits, analyze and address ROM deficits, analyze and address strength deficits, analyze and address soft tissue restrictions, analyze and cue for proper movement patterns, and instruct in home and community integration to address functional deficits and attain remaining goals.    Progress toward goals / Updated goals:  []  See Progress Note/Recertification    1st session since initial eval, no significant progress noted in return to function.      PLAN  yes Continue plan of care  []  Upgrade activities as tolerated  []  Discharge: See DC Note  []  Other:    Sterling \"BJ\" NASIM Demarco, Cert. MDT, Cert. DN, Cert. SMT, Dip. Osteopractic    1/10/2025    9:25 AM  If an interpreting service was utilized for treatment of this patient, the contents of this document represent the material reviewed with the patient via the .  Future Appointments   Date Time Provider Department Center   1/17/2025  9:20 AM Jaswant Diana PT [facility]   1/24/2025  9:20 AM Lana Mckeon PTA [facility]   1/31/2025  9:20 AM Lana Mckeon PTA [facility]

## 2025-01-24 ENCOUNTER — HOSPITAL ENCOUNTER (OUTPATIENT)
Facility: HOSPITAL | Age: 40
Setting detail: RECURRING SERIES
Discharge: HOME | End: 2025-01-27
Payer: COMMERCIAL

## 2025-01-24 PROCEDURE — 97530 THERAPEUTIC ACTIVITIES: CPT

## 2025-01-24 PROCEDURE — 97110 THERAPEUTIC EXERCISES: CPT

## 2025-01-24 PROCEDURE — 97016 VASOPNEUMATIC DEVICE THERAPY: CPT

## 2025-01-24 PROCEDURE — 97112 NEUROMUSCULAR REEDUCATION: CPT

## 2025-01-24 NOTE — PROGRESS NOTES
PHYSICAL  DAILY TREATMENT NOTE     Patient Name: Kyle Orozco    Date: 2025    : 1985  Insurance: Payor: AETNA / Plan: AETNA / Product Type: *No Product type* /      Patient  verified Yes     Visit #   Current / Total 3 6-12   Time   In / Out 923 1020   Pain   In / Out 6/10 010   Subjective Functional Status/Changes: \"I think the cold effects my pain. I was told I could start running and I been doing p! Free squats\"       Next PN/ RC due 2/3/25  Auth due Med nec    TREATMENT AREA =  Pain in left knee [M25.562]    OBJECTIVE      Modalities Rationale:     decrease edema, decrease inflammation, and decrease pain to improve patient's ability to progress to PLOF and address remaining functional goals.    10 min []  Vasopneumatic Device, press/temp:   max pressure/34 degs    min []  Whirlpool / Fluido:    If using vaso (only need to measure limb vaso being performed on)      pre-treatment girth : 40 cm      post-treatment girth : 39 cm      measured at (landmark location) : mid patella     min []  Other:    Skin assessment post-treatment:   Intact         Therapeutic Procedures:    Tx Min Billable or 1:1 Min (if diff from Tx Min) Procedure, Rationale, Specifics   13  02803 Therapeutic Exercise (timed):  increase ROM, strength, coordination, balance, and proprioception to improve patient's ability to progress to PLOF and address remaining functional goals. (see flow sheet as applicable)     Details if applicable:       10  97896 Neuromuscular Re-Education (timed):  improve balance, coordination, kinesthetic sense, posture, core stability and proprioception to improve patient's ability to develop conscious control of individual muscles and awareness of position of extremities in order to progress to PLOF and address remaining functional goals. (see flow sheet as applicable)     Details if applicable:     24  [id number] Therapeutic Activity (timed):  use of dynamic activities replicating functional movements

## 2025-01-31 ENCOUNTER — HOSPITAL ENCOUNTER (OUTPATIENT)
Facility: HOSPITAL | Age: 40
Setting detail: RECURRING SERIES
End: 2025-01-31
Payer: COMMERCIAL

## 2025-01-31 PROCEDURE — 97110 THERAPEUTIC EXERCISES: CPT

## 2025-01-31 PROCEDURE — 97016 VASOPNEUMATIC DEVICE THERAPY: CPT

## 2025-01-31 PROCEDURE — 97112 NEUROMUSCULAR REEDUCATION: CPT

## 2025-01-31 PROCEDURE — 97530 THERAPEUTIC ACTIVITIES: CPT

## 2025-01-31 NOTE — PROGRESS NOTES
PHYSICAL  DAILY TREATMENT NOTE     Patient Name: Kyle Orozco    Date: 2025    : 1985  Insurance: Payor: AETNA / Plan: AETNA / Product Type: *No Product type* /      Patient  verified Yes     Visit #   Current / Total 4 6-12   Time   In / Out 922 1019   Pain   In / Out -6/10 0/10   Subjective Functional Status/Changes: \"I  have not used my bands at home, I will\"       Next PN/ RC due 2/3/25  Auth due Med nec    TREATMENT AREA =  Pain in left knee [M25.562]    OBJECTIVE      Modalities Rationale:     decrease edema, decrease inflammation, and decrease pain to improve patient's ability to progress to PLOF and address remaining functional goals.    10 min []  Vasopneumatic Device, press/temp:   max pressure/34 degs    min []  Whirlpool / Fluido:    If using vaso (only need to measure limb vaso being performed on)      pre-treatment girth :39 cm      post-treatment girth : 38 cm      measured at (landmark location) : mid patella     min []  Other:    Skin assessment post-treatment:   Intact         Therapeutic Procedures:    Tx Min Billable or 1:1 Min (if diff from Tx Min) Procedure, Rationale, Specifics   13  [id number] Therapeutic Exercise (timed):  increase ROM, strength, coordination, balance, and proprioception to improve patient's ability to progress to PLOF and address remaining functional goals. (see flow sheet as applicable)     Details if applicable:       10  95727 Neuromuscular Re-Education (timed):  improve balance, coordination, kinesthetic sense, posture, core stability and proprioception to improve patient's ability to develop conscious control of individual muscles and awareness of position of extremities in order to progress to PLOF and address remaining functional goals. (see flow sheet as applicable)     Details if applicable:     24  07068 Therapeutic Activity (timed):  use of dynamic activities replicating functional movements to increase ROM, strength, coordination, balance, and